# Patient Record
Sex: FEMALE | Race: WHITE | NOT HISPANIC OR LATINO | Employment: OTHER | URBAN - METROPOLITAN AREA
[De-identification: names, ages, dates, MRNs, and addresses within clinical notes are randomized per-mention and may not be internally consistent; named-entity substitution may affect disease eponyms.]

---

## 2023-08-18 LAB
ALANINE AMINOTRANSFERASE (SGPT) (U/L) IN SER/PLAS: 16 U/L (ref 7–45)
ALBUMIN (G/DL) IN SER/PLAS: 3.8 G/DL (ref 3.4–5)
ALKALINE PHOSPHATASE (U/L) IN SER/PLAS: 56 U/L (ref 33–136)
ANION GAP IN SER/PLAS: 10 MMOL/L (ref 10–20)
ASPARTATE AMINOTRANSFERASE (SGOT) (U/L) IN SER/PLAS: 26 U/L (ref 9–39)
BASOPHILS (10*3/UL) IN BLOOD BY AUTOMATED COUNT: 0.04 X10E9/L (ref 0–0.1)
BASOPHILS/100 LEUKOCYTES IN BLOOD BY AUTOMATED COUNT: 0.5 % (ref 0–2)
BILIRUBIN TOTAL (MG/DL) IN SER/PLAS: 0.3 MG/DL (ref 0–1.2)
CALCIUM (MG/DL) IN SER/PLAS: 8 MG/DL (ref 8.6–10.3)
CARBON DIOXIDE, TOTAL (MMOL/L) IN SER/PLAS: 28 MMOL/L (ref 21–32)
CHLORIDE (MMOL/L) IN SER/PLAS: 103 MMOL/L (ref 98–107)
CHOLESTEROL (MG/DL) IN SER/PLAS: 153 MG/DL (ref 0–199)
CHOLESTEROL IN HDL (MG/DL) IN SER/PLAS: 67.8 MG/DL
CHOLESTEROL/HDL RATIO: 2.3
CREATININE (MG/DL) IN SER/PLAS: 1.04 MG/DL (ref 0.5–1.05)
EOSINOPHILS (10*3/UL) IN BLOOD BY AUTOMATED COUNT: 0.13 X10E9/L (ref 0–0.4)
EOSINOPHILS/100 LEUKOCYTES IN BLOOD BY AUTOMATED COUNT: 1.7 % (ref 0–6)
ERYTHROCYTE DISTRIBUTION WIDTH (RATIO) BY AUTOMATED COUNT: 16.2 % (ref 11.5–14.5)
ERYTHROCYTE MEAN CORPUSCULAR HEMOGLOBIN CONCENTRATION (G/DL) BY AUTOMATED: 31.1 G/DL (ref 32–36)
ERYTHROCYTE MEAN CORPUSCULAR VOLUME (FL) BY AUTOMATED COUNT: 89 FL (ref 80–100)
ERYTHROCYTES (10*6/UL) IN BLOOD BY AUTOMATED COUNT: 3.99 X10E12/L (ref 4–5.2)
GFR FEMALE: 54 ML/MIN/1.73M2
GLUCOSE (MG/DL) IN SER/PLAS: 112 MG/DL (ref 74–99)
HEMATOCRIT (%) IN BLOOD BY AUTOMATED COUNT: 35.7 % (ref 36–46)
HEMOGLOBIN (G/DL) IN BLOOD: 11.1 G/DL (ref 12–16)
IMMATURE GRANULOCYTES/100 LEUKOCYTES IN BLOOD BY AUTOMATED COUNT: 0.3 % (ref 0–0.9)
LDL: 71 MG/DL (ref 0–99)
LEUKOCYTES (10*3/UL) IN BLOOD BY AUTOMATED COUNT: 7.8 X10E9/L (ref 4.4–11.3)
LYMPHOCYTES (10*3/UL) IN BLOOD BY AUTOMATED COUNT: 1.33 X10E9/L (ref 0.8–3)
LYMPHOCYTES/100 LEUKOCYTES IN BLOOD BY AUTOMATED COUNT: 17.1 % (ref 13–44)
MONOCYTES (10*3/UL) IN BLOOD BY AUTOMATED COUNT: 0.7 X10E9/L (ref 0.05–0.8)
MONOCYTES/100 LEUKOCYTES IN BLOOD BY AUTOMATED COUNT: 9 % (ref 2–10)
NEUTROPHILS (10*3/UL) IN BLOOD BY AUTOMATED COUNT: 5.55 X10E9/L (ref 1.6–5.5)
NEUTROPHILS/100 LEUKOCYTES IN BLOOD BY AUTOMATED COUNT: 71.4 % (ref 40–80)
NRBC (PER 100 WBCS) BY AUTOMATED COUNT: 0 /100 WBC (ref 0–0)
PLATELETS (10*3/UL) IN BLOOD AUTOMATED COUNT: 234 X10E9/L (ref 150–450)
POTASSIUM (MMOL/L) IN SER/PLAS: 4.1 MMOL/L (ref 3.5–5.3)
PROTEIN TOTAL: 6.1 G/DL (ref 6.4–8.2)
SODIUM (MMOL/L) IN SER/PLAS: 137 MMOL/L (ref 136–145)
THYROTROPIN (MIU/L) IN SER/PLAS BY DETECTION LIMIT <= 0.05 MIU/L: 1.77 MIU/L (ref 0.44–3.98)
TRIGLYCERIDE (MG/DL) IN SER/PLAS: 70 MG/DL (ref 0–149)
UREA NITROGEN (MG/DL) IN SER/PLAS: 15 MG/DL (ref 6–23)
VLDL: 14 MG/DL (ref 0–40)

## 2023-10-23 ENCOUNTER — HOSPITAL ENCOUNTER (OUTPATIENT)
Dept: RADIOLOGY | Facility: HOSPITAL | Age: 78
Discharge: HOME | End: 2023-10-23
Payer: MEDICARE

## 2023-10-23 DIAGNOSIS — K21.9 GASTRO-ESOPHAGEAL REFLUX DISEASE WITHOUT ESOPHAGITIS: ICD-10-CM

## 2023-10-23 PROCEDURE — 74220 X-RAY XM ESOPHAGUS 1CNTRST: CPT | Mod: FY

## 2023-10-23 PROCEDURE — 74220 X-RAY XM ESOPHAGUS 1CNTRST: CPT | Performed by: STUDENT IN AN ORGANIZED HEALTH CARE EDUCATION/TRAINING PROGRAM

## 2023-12-02 ENCOUNTER — APPOINTMENT (OUTPATIENT)
Dept: RADIOLOGY | Facility: HOSPITAL | Age: 78
End: 2023-12-02
Payer: MEDICARE

## 2023-12-02 ENCOUNTER — HOSPITAL ENCOUNTER (EMERGENCY)
Facility: HOSPITAL | Age: 78
Discharge: HOME | End: 2023-12-02
Attending: EMERGENCY MEDICINE
Payer: MEDICARE

## 2023-12-02 VITALS
BODY MASS INDEX: 30.36 KG/M2 | WEIGHT: 165 LBS | TEMPERATURE: 97.9 F | SYSTOLIC BLOOD PRESSURE: 156 MMHG | HEART RATE: 75 BPM | HEIGHT: 62 IN | RESPIRATION RATE: 16 BRPM | OXYGEN SATURATION: 96 % | DIASTOLIC BLOOD PRESSURE: 73 MMHG

## 2023-12-02 DIAGNOSIS — M54.50 LOW BACK PAIN WITHOUT SCIATICA, UNSPECIFIED BACK PAIN LATERALITY, UNSPECIFIED CHRONICITY: Primary | ICD-10-CM

## 2023-12-02 PROCEDURE — 70450 CT HEAD/BRAIN W/O DYE: CPT | Performed by: RADIOLOGY

## 2023-12-02 PROCEDURE — 72128 CT CHEST SPINE W/O DYE: CPT | Performed by: RADIOLOGY

## 2023-12-02 PROCEDURE — 72125 CT NECK SPINE W/O DYE: CPT | Performed by: RADIOLOGY

## 2023-12-02 PROCEDURE — 72125 CT NECK SPINE W/O DYE: CPT

## 2023-12-02 PROCEDURE — 70450 CT HEAD/BRAIN W/O DYE: CPT

## 2023-12-02 PROCEDURE — 99285 EMERGENCY DEPT VISIT HI MDM: CPT | Performed by: EMERGENCY MEDICINE

## 2023-12-02 PROCEDURE — 72128 CT CHEST SPINE W/O DYE: CPT

## 2023-12-02 ASSESSMENT — COLUMBIA-SUICIDE SEVERITY RATING SCALE - C-SSRS
1. IN THE PAST MONTH, HAVE YOU WISHED YOU WERE DEAD OR WISHED YOU COULD GO TO SLEEP AND NOT WAKE UP?: NO
6. HAVE YOU EVER DONE ANYTHING, STARTED TO DO ANYTHING, OR PREPARED TO DO ANYTHING TO END YOUR LIFE?: NO
2. HAVE YOU ACTUALLY HAD ANY THOUGHTS OF KILLING YOURSELF?: NO

## 2023-12-02 ASSESSMENT — PAIN SCALES - GENERAL: PAINLEVEL_OUTOF10: 8

## 2023-12-02 ASSESSMENT — PAIN DESCRIPTION - DESCRIPTORS: DESCRIPTORS: PRESSURE

## 2023-12-02 ASSESSMENT — PAIN DESCRIPTION - PAIN TYPE: TYPE: ACUTE PAIN

## 2023-12-02 ASSESSMENT — PAIN DESCRIPTION - LOCATION: LOCATION: HEAD

## 2023-12-02 ASSESSMENT — PAIN - FUNCTIONAL ASSESSMENT: PAIN_FUNCTIONAL_ASSESSMENT: 0-10
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
## 2024-01-31 ENCOUNTER — OFFICE VISIT (OUTPATIENT)
Dept: VASCULAR SURGERY | Facility: CLINIC | Age: 79
End: 2024-01-31
Payer: MEDICARE

## 2024-01-31 VITALS
HEIGHT: 62 IN | SYSTOLIC BLOOD PRESSURE: 124 MMHG | HEART RATE: 75 BPM | WEIGHT: 180 LBS | DIASTOLIC BLOOD PRESSURE: 76 MMHG | BODY MASS INDEX: 33.13 KG/M2

## 2024-01-31 DIAGNOSIS — M79.606 PAIN AND SWELLING OF LOWER EXTREMITY, UNSPECIFIED LATERALITY: ICD-10-CM

## 2024-01-31 DIAGNOSIS — I83.813 VARICOSE VEINS OF BOTH LOWER EXTREMITIES WITH PAIN: ICD-10-CM

## 2024-01-31 DIAGNOSIS — M79.89 PAIN AND SWELLING OF LOWER EXTREMITY, UNSPECIFIED LATERALITY: ICD-10-CM

## 2024-01-31 PROCEDURE — 1036F TOBACCO NON-USER: CPT | Performed by: SURGERY

## 2024-01-31 PROCEDURE — 1125F AMNT PAIN NOTED PAIN PRSNT: CPT | Performed by: SURGERY

## 2024-01-31 PROCEDURE — 1159F MED LIST DOCD IN RCRD: CPT | Performed by: SURGERY

## 2024-01-31 PROCEDURE — 99214 OFFICE O/P EST MOD 30 MIN: CPT | Performed by: SURGERY

## 2024-01-31 PROCEDURE — 1160F RVW MEDS BY RX/DR IN RCRD: CPT | Performed by: SURGERY

## 2024-01-31 RX ORDER — METOPROLOL SUCCINATE 50 MG/1
1 TABLET, EXTENDED RELEASE ORAL DAILY
COMMUNITY

## 2024-01-31 RX ORDER — ASPIRIN 81 MG/1
81 TABLET ORAL
COMMUNITY
Start: 2023-06-27

## 2024-01-31 RX ORDER — DOXAZOSIN 4 MG/1
1 TABLET ORAL NIGHTLY
COMMUNITY
Start: 2022-04-21

## 2024-01-31 RX ORDER — DOXAZOSIN 2 MG/1
1 TABLET ORAL NIGHTLY
COMMUNITY
Start: 2022-05-24

## 2024-01-31 RX ORDER — ALBUTEROL SULFATE 90 UG/1
AEROSOL, METERED RESPIRATORY (INHALATION)
COMMUNITY
Start: 2023-09-08

## 2024-01-31 RX ORDER — BUDESONIDE, GLYCOPYRROLATE, AND FORMOTEROL FUMARATE 160; 9; 4.8 UG/1; UG/1; UG/1
2 AEROSOL, METERED RESPIRATORY (INHALATION) 2 TIMES DAILY
COMMUNITY
Start: 2023-05-18

## 2024-01-31 RX ORDER — EZETIMIBE 10 MG/1
1 TABLET ORAL DAILY
COMMUNITY
End: 2024-05-08 | Stop reason: SDUPTHER

## 2024-01-31 RX ORDER — TORSEMIDE 20 MG/1
1 TABLET ORAL DAILY
COMMUNITY
Start: 2023-03-20

## 2024-01-31 RX ORDER — FLAXSEED OIL 1000 MG
CAPSULE ORAL
COMMUNITY

## 2024-01-31 ASSESSMENT — COLUMBIA-SUICIDE SEVERITY RATING SCALE - C-SSRS
2. HAVE YOU ACTUALLY HAD ANY THOUGHTS OF KILLING YOURSELF?: NO
6. HAVE YOU EVER DONE ANYTHING, STARTED TO DO ANYTHING, OR PREPARED TO DO ANYTHING TO END YOUR LIFE?: NO
1. IN THE PAST MONTH, HAVE YOU WISHED YOU WERE DEAD OR WISHED YOU COULD GO TO SLEEP AND NOT WAKE UP?: NO

## 2024-01-31 ASSESSMENT — PATIENT HEALTH QUESTIONNAIRE - PHQ9
1. LITTLE INTEREST OR PLEASURE IN DOING THINGS: NOT AT ALL
2. FEELING DOWN, DEPRESSED OR HOPELESS: NOT AT ALL
SUM OF ALL RESPONSES TO PHQ9 QUESTIONS 1 AND 2: 0
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
## 2024-02-20 ENCOUNTER — APPOINTMENT (OUTPATIENT)
Dept: VASCULAR MEDICINE | Facility: HOSPITAL | Age: 79
End: 2024-02-20
Payer: MEDICARE

## 2024-03-01 ENCOUNTER — HOSPITAL ENCOUNTER (OUTPATIENT)
Dept: VASCULAR MEDICINE | Facility: HOSPITAL | Age: 79
Discharge: HOME | End: 2024-03-01
Payer: MEDICARE

## 2024-03-01 DIAGNOSIS — M79.606 PAIN AND SWELLING OF LOWER EXTREMITY, UNSPECIFIED LATERALITY: ICD-10-CM

## 2024-03-01 DIAGNOSIS — I83.813 VARICOSE VEINS OF BOTH LOWER EXTREMITIES WITH PAIN: ICD-10-CM

## 2024-03-01 DIAGNOSIS — I87.2 VENOUS INSUFFICIENCY (CHRONIC) (PERIPHERAL): ICD-10-CM

## 2024-03-01 DIAGNOSIS — M79.89 PAIN AND SWELLING OF LOWER EXTREMITY, UNSPECIFIED LATERALITY: ICD-10-CM

## 2024-03-01 PROCEDURE — 93970 EXTREMITY STUDY: CPT

## 2024-03-01 PROCEDURE — 93970 EXTREMITY STUDY: CPT | Performed by: INTERNAL MEDICINE

## 2024-03-07 ENCOUNTER — OFFICE VISIT (OUTPATIENT)
Dept: VASCULAR SURGERY | Facility: CLINIC | Age: 79
End: 2024-03-07
Payer: MEDICARE

## 2024-03-07 VITALS
WEIGHT: 176 LBS | OXYGEN SATURATION: 96 % | BODY MASS INDEX: 32.39 KG/M2 | HEART RATE: 67 BPM | DIASTOLIC BLOOD PRESSURE: 60 MMHG | HEIGHT: 62 IN | SYSTOLIC BLOOD PRESSURE: 130 MMHG

## 2024-03-07 DIAGNOSIS — M79.89 PAIN AND SWELLING OF LOWER EXTREMITY, UNSPECIFIED LATERALITY: ICD-10-CM

## 2024-03-07 DIAGNOSIS — M79.606 PAIN AND SWELLING OF LOWER EXTREMITY, UNSPECIFIED LATERALITY: ICD-10-CM

## 2024-03-07 DIAGNOSIS — I83.813 VARICOSE VEINS OF BOTH LOWER EXTREMITIES WITH PAIN: Primary | ICD-10-CM

## 2024-03-07 PROCEDURE — 99214 OFFICE O/P EST MOD 30 MIN: CPT | Performed by: SURGERY

## 2024-03-07 PROCEDURE — 1159F MED LIST DOCD IN RCRD: CPT | Performed by: SURGERY

## 2024-03-07 PROCEDURE — 1160F RVW MEDS BY RX/DR IN RCRD: CPT | Performed by: SURGERY

## 2024-03-07 PROCEDURE — 1036F TOBACCO NON-USER: CPT | Performed by: SURGERY

## 2024-03-07 PROCEDURE — 1125F AMNT PAIN NOTED PAIN PRSNT: CPT | Performed by: SURGERY

## 2024-03-10 NOTE — PROGRESS NOTES
"Jesi Ramos is a 78 y.o. female     Subjective   This patient presents today for follow-up of her venous issues of her lower extremities.  She is 5 foot 2 and weighs 176 pounds.  She quit smoking in 2008.  She is currently 78 years old.  She does have a small venous stasis ulcer with a very very small opening involving her left lower leg.  She has been placing topical antibiotic cream over the ulcer and surrounding skin.  She currently denies any fever chills nausea vomiting or headache.  She does admit to some achiness and heaviness of her legs.  She is currently not a diabetic.           Objective     Vitals:    03/07/24 1100   BP: 130/60   Pulse: 67   SpO2: 96%      Physical Exam  This patient is alert and oriented x 3.  She is in no acute distress.  Head is normocephalic.  Pupils are equal and reactive to light accommodation.  Neck is soft and supple without palpable lymph nodes.  Heart is regular rate.  Lungs are relatively clear.  Abdomen is soft with positive bowel sounds there is no pain on palpation.  Upper extremities seem to have adequate range of motion.  Her lower extremities have some decreased range of motion with palpable brachial radial femoral and popliteal pulses.  Skin turgor slightly decreased in her lower extremities left greater than right.  Psychologically she appears to be acting appropriately.  She has a very small open venous stasis ulcer left lower leg.  There is also some satellite red lesions identified around the area.  Blood pressure 130/60, pulse 67, height 1.575 m (5' 2\"), weight 79.8 kg (176 lb), SpO2 96 %.            Patient Active Problem List    Diagnosis Date Noted    Varicose veins of both lower extremities with pain 01/31/2024    Pain and swelling of lower extremity 01/31/2024          Current Outpatient Medications:     albuterol 90 mcg/actuation inhaler, INHALE TWO PUFFS BY MOUTH AS INSTRUCTED EVERY 4 HOURS AS NEEDED, Disp: , Rfl:     aspirin 81 mg EC tablet, 1 tablet " (81 mg)., Disp: , Rfl:     Breztri Aerosphere 160-9-4.8 mcg/actuation HFA aerosol inhaler, Inhale 2 puffs twice a day., Disp: , Rfl:     calcium phosphate trib/vit D3 (CALCIUM PHOSPHATE-VITAMIN D3 ORAL), Take by mouth once daily., Disp: , Rfl:     doxazosin (Cardura) 2 mg tablet, Take 1 tablet (2 mg) by mouth once daily at bedtime., Disp: , Rfl:     doxazosin (Cardura) 4 mg tablet, Take 1 tablet (4 mg) by mouth once daily at bedtime., Disp: , Rfl:     ezetimibe (Zetia) 10 mg tablet, Take 1 tablet (10 mg) by mouth once daily., Disp: , Rfl:     flaxseed oiL 1,000 mg capsule, Take by mouth., Disp: , Rfl:     metoprolol succinate XL (Toprol-XL) 50 mg 24 hr tablet, Take 1 tablet (50 mg) by mouth once daily., Disp: , Rfl:     torsemide (Demadex) 20 mg tablet, Take 1 tablet (20 mg) by mouth once daily., Disp: , Rfl:      Lab Results   Component Value Date    WBC 7.8 08/18/2023    HGB 11.1 (L) 08/18/2023    HCT 35.7 (L) 08/18/2023     08/18/2023    CHOL 153 08/18/2023    TRIG 70 08/18/2023    HDL 67.8 08/18/2023    ALT 16 08/18/2023    AST 26 08/18/2023     08/18/2023    K 4.1 08/18/2023     08/18/2023    CREATININE 1.04 08/18/2023    BUN 15 08/18/2023    CO2 28 08/18/2023    TSH 1.77 08/18/2023    INR CANCELED 03/14/2023    INR 1.3 (H) 03/14/2023       Vascular US lower extremity venous insufficiency bilateral    Result Date: 3/3/2024           [address] [place], [place] [address] Tel [phone] and Fax [phone]  Vascular Lab Report VASC US LOWER EXTREMITY VENOUS INSUFFICIENCY BILATERAL  Patient Name:      KAYLEE VELASQUEZ        Reading Physician:  [id number] Brianna Garcia MD Study Date:        3/1/2024            Ordering Physician: [id number] JERRELL CHEN MRN/PID:           [id number]            Technologist:       Justyna Dietz T Accession#:        [id number]        Technologist 2: Date of Birth/Age: 1945 / 78      Encounter#:         [id number]                    years Gender:            F Admission Status:  Outpatient          Location Performed: [facility]  Diagnosis/ICD:    Venous insufficiency (chronic) (peripheral)-I87.2 Indication:       Varicose veins ulcer CPT Codes:        51874 Venous reflux study VV VI complete Patient Position: Study performed in a reverse Trendelenburg position.  CONCLUSIONS: Right Lower Venous Insufficiency: Right leg demonstrates no evidence of deep vein thrombosis or deep or superficial venous insufficiency. The small saphenous vein was not visualized. Left Lower Venous Insufficiency: Left leg demonstrates no evidence of deep vein thrombosis or deep or superficial venous insufficiency. The small saphenous vein was not visualized.  Comparison: Compared with study from 3/15/2023, resolution of prior right leg deep vein thrombosis.  Imaging & Doppler Findings:  Right          Compress Thrombus SFJ              Yes      None Prox Thigh GSV   Yes      None Mid Thigh GSV    Yes      None Knee GSV         Yes      None Prox Calf GSV    Yes      None Mid Calf GSV     Yes      None Dist Calf GSV    Yes      None  Left           Compress Thrombus SFJ              Yes      None Prox Thigh GSV   Yes      None Mid Thigh GSV    Yes      None Knee GSV         Yes      None Prox Calf GSV    Yes      None Mid Calf GSV     Yes      None Dist Calf GSV    Yes      None  Right                 Compressible Thrombus        Flow Distal External Iliac     Yes        None       Pulsatile CFV                       Yes        None       Pulsatile PFV                       Yes        None FV Proximal               Yes        None       Pulsatile FV Mid                    Yes        None       Pulsatile FV Distal                 Yes        None Popliteal                 Yes        None   Spontaneous/Phasic Peroneal                  Yes        None PTV                       Yes        None  Left                   Compress Thrombus        Flow Distal External Iliac   Yes      None       Pulsatile CFV                     Yes      None       Pulsatile PFV                     Yes      None FV Proximal             Yes      None       Pulsatile FV Mid                  Yes      None       Pulsatile FV Distal               Yes      None   Spontaneous/Phasic Popliteal               Yes      None   Spontaneous/Phasic Peroneal                Yes      None PTV                     Yes      None  [id number] Brianna Garcia MD Electronically signed by [id number] Brianna Garcia MD on 3/3/2024 at 1:33:36 PM  ** Final **                 Assessment/Plan   Active Problems:  There are no active Hospital Problems.      This patient presents today for follow-up of her venous issues of her lower extremities.  She does have a very very small open venous stasis ulcer of her left lower extremity.  She has been placing topical antibiotic over the ulcer and surrounding skin.  She does have red satellite lesions around the ulcer which I suspect is fungal from the topical antibiotic.  I have recommended to the patient that she stops the topical antibiotic.  She did have a venous duplex scan with reflux study that does show pulsatility throughout her right and left lower extremity deep system.  I am recommending that she elevates her legs periodically during the day and to exercise her lower extremities on a consistent basis the stationary bike or floor paddles.  She did have an echo done in March of last year which did not show any significant valvular issues and a normal ejection fraction.  I am also encouraging her to keep a sterile bandage over the small ulceration and to wear her compression stockings.  I would like her to follow-up with me in 3 months      Ovi Rouse, DO

## 2024-04-11 PROBLEM — N95.0 PMB (POSTMENOPAUSAL BLEEDING): Status: ACTIVE | Noted: 2023-06-30

## 2024-04-11 PROBLEM — J18.9 PNEUMONIA: Status: ACTIVE | Noted: 2024-04-11

## 2024-04-11 PROBLEM — Z86.73 HISTORY OF CEREBROVASCULAR ACCIDENT: Status: ACTIVE | Noted: 2024-04-11

## 2024-04-11 PROBLEM — R10.9 ABDOMINAL PAIN: Status: ACTIVE | Noted: 2023-07-27

## 2024-04-11 PROBLEM — B35.1 ONYCHOMYCOSIS: Status: ACTIVE | Noted: 2024-04-11

## 2024-04-11 PROBLEM — I71.21 ASCENDING AORTIC ANEURYSM (CMS-HCC): Status: ACTIVE | Noted: 2022-07-11

## 2024-04-11 PROBLEM — I99.8 VASCULAR INSUFFICIENCY: Status: ACTIVE | Noted: 2024-04-11

## 2024-04-11 PROBLEM — Z86.39 HISTORY OF OBESITY: Status: ACTIVE | Noted: 2024-04-11

## 2024-04-11 PROBLEM — R94.31 ABNORMAL ECG: Status: ACTIVE | Noted: 2022-07-11

## 2024-04-11 PROBLEM — M79.672 FOOT PAIN, BILATERAL: Status: ACTIVE | Noted: 2024-04-11

## 2024-04-11 PROBLEM — I80.01: Status: ACTIVE | Noted: 2024-04-11

## 2024-04-11 PROBLEM — I73.9 PAD (PERIPHERAL ARTERY DISEASE) (CMS-HCC): Status: ACTIVE | Noted: 2024-04-11

## 2024-04-11 PROBLEM — L85.3 ASTEATOSIS CUTIS: Status: ACTIVE | Noted: 2024-04-11

## 2024-04-11 PROBLEM — I21.4 ACUTE NON Q WAVE MYOCARDIAL INFARCTION (MULTI): Status: ACTIVE | Noted: 2024-04-11

## 2024-04-11 PROBLEM — J98.4 CHRONIC LUNG DISEASE: Status: ACTIVE | Noted: 2024-04-11

## 2024-04-11 PROBLEM — S81.809A WOUND OF LOWER EXTREMITY: Status: ACTIVE | Noted: 2024-04-11

## 2024-04-11 PROBLEM — M21.621 TAILOR'S BUNION OF BOTH FEET: Status: ACTIVE | Noted: 2024-04-11

## 2024-04-11 PROBLEM — L25.9 CONTACT DERMATITIS: Status: ACTIVE | Noted: 2024-04-11

## 2024-04-11 PROBLEM — I10 ESSENTIAL HYPERTENSION: Status: ACTIVE | Noted: 2022-07-11

## 2024-04-11 PROBLEM — R79.89 ELEVATED TROPONIN LEVEL: Status: ACTIVE | Noted: 2024-04-11

## 2024-04-11 PROBLEM — I25.10 ARTERIOSCLEROSIS OF CORONARY ARTERY: Status: ACTIVE | Noted: 2024-04-11

## 2024-04-11 PROBLEM — L84 HELOMA MOLLE: Status: ACTIVE | Noted: 2024-04-11

## 2024-04-11 PROBLEM — M54.50 LOW BACK PAIN: Status: ACTIVE | Noted: 2024-04-11

## 2024-04-11 PROBLEM — S39.012A LUMBAR SPINE STRAIN, INITIAL ENCOUNTER: Status: ACTIVE | Noted: 2023-06-12

## 2024-04-11 PROBLEM — I26.99 PULMONARY EMBOLISM (MULTI): Status: ACTIVE | Noted: 2024-04-11

## 2024-04-11 PROBLEM — Z86.39 HISTORY OF HYPERCHOLESTEROLEMIA: Status: ACTIVE | Noted: 2024-04-11

## 2024-04-11 PROBLEM — K80.10 CHRONIC CHOLECYSTITIS WITH CALCULUS: Status: ACTIVE | Noted: 2024-04-11

## 2024-04-11 PROBLEM — G45.9 TRANSIENT ISCHEMIC ATTACK: Status: ACTIVE | Noted: 2024-04-11

## 2024-04-11 PROBLEM — R07.89 CHEST PAIN, MIDSTERNAL: Status: ACTIVE | Noted: 2022-04-28

## 2024-04-11 PROBLEM — R26.89 ANTALGIC GAIT: Status: ACTIVE | Noted: 2024-04-11

## 2024-04-11 PROBLEM — L50.9 URTICARIA: Status: ACTIVE | Noted: 2024-04-11

## 2024-04-11 PROBLEM — M19.011 ARTHRITIS OF RIGHT SHOULDER REGION: Status: ACTIVE | Noted: 2023-11-13

## 2024-04-11 PROBLEM — E78.00 HYPERCHOLESTEREMIA: Status: ACTIVE | Noted: 2022-07-11

## 2024-04-11 PROBLEM — R91.8 PULMONARY NODULES: Status: ACTIVE | Noted: 2021-07-21

## 2024-04-11 PROBLEM — M21.622 TAILOR'S BUNION OF BOTH FEET: Status: ACTIVE | Noted: 2024-04-11

## 2024-04-11 PROBLEM — R06.02 SHORTNESS OF BREATH: Status: ACTIVE | Noted: 2021-07-21

## 2024-04-11 PROBLEM — Z86.711 HX PULMONARY EMBOLISM: Status: ACTIVE | Noted: 2024-04-11

## 2024-04-11 PROBLEM — M79.671 FOOT PAIN, BILATERAL: Status: ACTIVE | Noted: 2024-04-11

## 2024-04-11 PROBLEM — M17.12 PRIMARY OSTEOARTHRITIS OF LEFT KNEE: Status: ACTIVE | Noted: 2023-06-12

## 2024-04-11 PROBLEM — K21.9 GASTROESOPHAGEAL REFLUX DISEASE: Status: ACTIVE | Noted: 2024-04-11

## 2024-04-11 PROBLEM — M70.62 TROCHANTERIC BURSITIS OF LEFT HIP: Status: ACTIVE | Noted: 2023-11-13

## 2024-04-11 PROBLEM — M17.0 PRIMARY OSTEOARTHRITIS OF BOTH KNEES: Status: ACTIVE | Noted: 2023-08-31

## 2024-04-11 RX ORDER — ALBUTEROL SULFATE 0.83 MG/ML
SOLUTION RESPIRATORY (INHALATION)
COMMUNITY
End: 2024-05-14 | Stop reason: WASHOUT

## 2024-04-11 RX ORDER — METOPROLOL TARTRATE 25 MG/1
TABLET, FILM COATED ORAL
COMMUNITY
Start: 2024-02-20 | End: 2024-05-14 | Stop reason: WASHOUT

## 2024-04-11 RX ORDER — DENOSUMAB 60 MG/ML
INJECTION SUBCUTANEOUS
COMMUNITY
Start: 2024-01-12

## 2024-04-11 RX ORDER — CEFUROXIME AXETIL 500 MG/1
500 TABLET ORAL 2 TIMES DAILY
COMMUNITY
Start: 2024-02-21 | End: 2024-05-14 | Stop reason: WASHOUT

## 2024-04-11 RX ORDER — SIMVASTATIN 20 MG/1
TABLET, FILM COATED ORAL
COMMUNITY
End: 2024-05-14 | Stop reason: WASHOUT

## 2024-04-11 RX ORDER — PANTOPRAZOLE SODIUM 40 MG/1
TABLET, DELAYED RELEASE ORAL
COMMUNITY
Start: 2023-09-27

## 2024-04-11 RX ORDER — CLOPIDOGREL BISULFATE 75 MG/1
TABLET ORAL
COMMUNITY
End: 2024-05-14

## 2024-04-11 RX ORDER — APIXABAN 5 MG/1
5 TABLET, FILM COATED ORAL 2 TIMES DAILY
COMMUNITY
Start: 2023-07-21 | End: 2024-05-14 | Stop reason: WASHOUT

## 2024-04-11 RX ORDER — ECONAZOLE NITRATE 10 MG/G
CREAM TOPICAL
COMMUNITY
Start: 2023-07-30

## 2024-04-11 RX ORDER — LOSARTAN POTASSIUM 25 MG/1
25 TABLET ORAL DAILY
COMMUNITY
Start: 2024-01-09 | End: 2024-05-14 | Stop reason: WASHOUT

## 2024-04-11 RX ORDER — PRAVASTATIN SODIUM 40 MG/1
1 TABLET ORAL DAILY
COMMUNITY
End: 2024-05-14 | Stop reason: WASHOUT

## 2024-04-15 ENCOUNTER — OFFICE VISIT (OUTPATIENT)
Dept: OTOLARYNGOLOGY | Facility: CLINIC | Age: 79
End: 2024-04-15
Payer: MEDICARE

## 2024-04-15 VITALS — BODY MASS INDEX: 32.76 KG/M2 | WEIGHT: 178 LBS | TEMPERATURE: 97.2 F | HEIGHT: 62 IN

## 2024-04-15 DIAGNOSIS — H93.8X1 SENSATION OF PLUGGED EAR ON RIGHT SIDE: Primary | ICD-10-CM

## 2024-04-15 DIAGNOSIS — H61.23 BILATERAL IMPACTED CERUMEN: ICD-10-CM

## 2024-04-15 PROCEDURE — 1126F AMNT PAIN NOTED NONE PRSNT: CPT | Performed by: NURSE PRACTITIONER

## 2024-04-15 PROCEDURE — 1036F TOBACCO NON-USER: CPT | Performed by: NURSE PRACTITIONER

## 2024-04-15 PROCEDURE — 69210 REMOVE IMPACTED EAR WAX UNI: CPT | Performed by: NURSE PRACTITIONER

## 2024-04-15 PROCEDURE — 99213 OFFICE O/P EST LOW 20 MIN: CPT | Performed by: NURSE PRACTITIONER

## 2024-04-15 PROCEDURE — 1160F RVW MEDS BY RX/DR IN RCRD: CPT | Performed by: NURSE PRACTITIONER

## 2024-04-15 PROCEDURE — 99203 OFFICE O/P NEW LOW 30 MIN: CPT | Performed by: NURSE PRACTITIONER

## 2024-04-15 PROCEDURE — 1159F MED LIST DOCD IN RCRD: CPT | Performed by: NURSE PRACTITIONER

## 2024-04-15 SDOH — ECONOMIC STABILITY: FOOD INSECURITY: WITHIN THE PAST 12 MONTHS, THE FOOD YOU BOUGHT JUST DIDN'T LAST AND YOU DIDN'T HAVE MONEY TO GET MORE.: NEVER TRUE

## 2024-04-15 SDOH — ECONOMIC STABILITY: FOOD INSECURITY: WITHIN THE PAST 12 MONTHS, YOU WORRIED THAT YOUR FOOD WOULD RUN OUT BEFORE YOU GOT MONEY TO BUY MORE.: NEVER TRUE

## 2024-04-15 ASSESSMENT — COLUMBIA-SUICIDE SEVERITY RATING SCALE - C-SSRS
6. HAVE YOU EVER DONE ANYTHING, STARTED TO DO ANYTHING, OR PREPARED TO DO ANYTHING TO END YOUR LIFE?: NO
2. HAVE YOU ACTUALLY HAD ANY THOUGHTS OF KILLING YOURSELF?: NO
1. IN THE PAST MONTH, HAVE YOU WISHED YOU WERE DEAD OR WISHED YOU COULD GO TO SLEEP AND NOT WAKE UP?: NO

## 2024-04-15 ASSESSMENT — LIFESTYLE VARIABLES
AUDIT-C TOTAL SCORE: 0
SKIP TO QUESTIONS 9-10: 1
HOW OFTEN DO YOU HAVE SIX OR MORE DRINKS ON ONE OCCASION: NEVER
HOW MANY STANDARD DRINKS CONTAINING ALCOHOL DO YOU HAVE ON A TYPICAL DAY: PATIENT DOES NOT DRINK
HOW OFTEN DO YOU HAVE A DRINK CONTAINING ALCOHOL: NEVER

## 2024-04-15 ASSESSMENT — PATIENT HEALTH QUESTIONNAIRE - PHQ9
2. FEELING DOWN, DEPRESSED OR HOPELESS: NOT AT ALL
1. LITTLE INTEREST OR PLEASURE IN DOING THINGS: NOT AT ALL
SUM OF ALL RESPONSES TO PHQ9 QUESTIONS 1 AND 2: 0

## 2024-04-15 ASSESSMENT — ENCOUNTER SYMPTOMS
LOSS OF SENSATION IN FEET: 0
DEPRESSION: 0
OCCASIONAL FEELINGS OF UNSTEADINESS: 0

## 2024-04-15 ASSESSMENT — PAIN SCALES - GENERAL: PAINLEVEL: 0-NO PAIN

## 2024-04-15 NOTE — PROGRESS NOTES
Subjective   Patient ID: Jesi Ramos is a 78 y.o. female who presents for New Patient Visit (Ear cleaning).    HPI  Patient here for ears. The right ear was bothering her. She was using some OTC cerumen removal drops which helped. It was feeling clogged with some pain and drainage. She has bilateral tinnitus for years. No hearing loss. No vertigo. No previous ear surgery. Loud noise exposure years ago. No family history of hearing loss.     Patient Active Problem List   Diagnosis    Varicose veins of other specified sites    Abdominal pain    Asteatosis cutis    Vascular insufficiency    Urticaria    Trochanteric bursitis of left hip    Transient ischemic attack    Onychomycosis    Ascending aortic aneurysm (CMS-HCC)    Tailor's bunion of both feet    Shortness of breath    Pulmonary nodules    Pulmonary embolism (Multi)    Primary osteoarthritis of left knee    Primary osteoarthritis of both knees    Pneumonia    PMB (postmenopausal bleeding)    Phlebitis of leg, superficial, right    PAD (peripheral artery disease) (CMS-HCC)    Lumbar spine strain, initial encounter    Low back pain    Wound of lower extremity    Hypercholesteremia    Hx pulmonary embolism    History of cerebrovascular accident    History of obesity    History of hypercholesterolemia    Elevated troponin level    Heloma molle    Gastroesophageal reflux disease    Antalgic gait    Foot pain, bilateral    Otalgia    Essential hypertension    Dizziness    CAD (coronary artery disease)    Chronic lung disease    Contact dermatitis    Chronic cholecystitis with calculus    Chest pain, midsternal    Benign neoplasm of cerebral meninges (Multi)    Arthritis of right shoulder region    Acute non Q wave myocardial infarction (Multi)    Abnormal ECG     Past Surgical History:   Procedure Laterality Date    GALLBLADDER SURGERY  06/26/2018    Gallbladder Surgery    OTHER SURGICAL HISTORY  03/22/2019    History of prior surgery     Review of Systems    All  other systems have been reviewed and are negative for complaints except for those mentioned in history of present illness, past medical history and problem list.    Objective   Physical Exam    Constitutional: No fever, chills, weight loss or weight gain  General appearance: Appears well, well-nourished, well groomed. No acute distress.    Communication: Normal communication    Psychiatric: Oriented to person, place and time. Normal mood and affect.    Neurologic: Cranial nerves II-XII grossly intact and symmetric bilaterally.    Head and Face:  Head: Atraumatic with no masses, lesions or scarring.  Face: Normal symmetry. No scars or deformities.  TMJ: Normal, no trismus.    Eyes: Conjunctiva not edematous or erythematous.     Right Ear: External inspection of ear with no deformity, scars, or masses. EAC is impacted with cerumen, TM not visible.    Left Ear: External inspection of ear with no deformity, scars, or masses. EAC is impacted with cerumen, TM not visible.    Nose: External inspection of nose: No nasal lesions, lacerations or scars. Anterior rhinoscopy with limited visualization past the inferior turbinates. No tenderness on frontal or maxillary sinus palpation.    Oral Cavity/Mouth: Oral cavity and oropharynx mucosa moist and pink. No lesions or masses. Dentition normal. Tonsils appear normal. Uvula is midline. Tongue with no masses or lesions. Tongue with good mobility. The oropharynx is clear.    Neck: Normal appearing, symmetric, trachea midline.     Cardiovascular: Examination of peripheral vascular system shows no clubbing or cyanosis.    Respiratory: No respiratory distress increased work of breathing. Inspection of the chest with symmetric chest expansion and normal respiratory effort.    Skin: No head and neck rashes.    Lymph nodes: No adenopathy.     Ear cerumen removal    Date/Time: 4/15/2024 11:08 AM    Performed by: TESFAYE Fiore  Authorized by: TESFAYE Fiore     Consent:     Consent obtained:  Verbal    Consent given by:  Patient  Procedure details:     Location:  L ear and R ear    Procedure outcomes: cerumen removed    Post-procedure details:     Inspection:  No bleeding, ear canal clear and TM intact    Hearing quality:  Improved  Comments:      Bilateral canals with cerumen impaction.  Using the microscope and alligator forceps, large amounts of soft brown cerumen removed bilaterally. Both TMs intact. No effusions or retractions noted.  Patient tolerated procedure well.      Assessment/Plan   Diagnoses and all orders for this visit:  Sensation of plugged ear on right side  Bilateral impacted cerumen  Bilateral ears were successfully cleaned. Follow up as needed for repeat ear cleaning.  All questions answered to patient satisfaction.          Ofe Finley, CHRISTIAN-CNP 04/15/24 10:46 AM

## 2024-04-22 PROBLEM — M25.812 IMPINGEMENT OF LEFT SHOULDER: Status: ACTIVE | Noted: 2024-03-28

## 2024-04-22 PROBLEM — E78.5 HYPERLIPIDEMIA: Status: ACTIVE | Noted: 2022-07-11

## 2024-04-22 PROBLEM — E78.5 HYPERLIPIDEMIA: Chronic | Status: ACTIVE | Noted: 2022-07-11

## 2024-04-22 PROBLEM — Z86.39 HISTORY OF HYPERCHOLESTEROLEMIA: Status: RESOLVED | Noted: 2024-04-11 | Resolved: 2024-04-22

## 2024-05-08 DIAGNOSIS — I26.93 SINGLE SUBSEGMENTAL PULMONARY EMBOLISM WITHOUT ACUTE COR PULMONALE (MULTI): Primary | ICD-10-CM

## 2024-05-08 RX ORDER — EZETIMIBE 10 MG/1
10 TABLET ORAL DAILY
Qty: 90 TABLET | Refills: 3 | Status: SHIPPED | OUTPATIENT
Start: 2024-05-08 | End: 2024-05-14 | Stop reason: SDUPTHER

## 2024-05-14 ENCOUNTER — OFFICE VISIT (OUTPATIENT)
Dept: CARDIOLOGY | Facility: CLINIC | Age: 79
End: 2024-05-14
Payer: MEDICARE

## 2024-05-14 VITALS
SYSTOLIC BLOOD PRESSURE: 124 MMHG | BODY MASS INDEX: 33.37 KG/M2 | DIASTOLIC BLOOD PRESSURE: 82 MMHG | HEART RATE: 64 BPM | WEIGHT: 181 LBS | OXYGEN SATURATION: 92 %

## 2024-05-14 DIAGNOSIS — I26.93 SINGLE SUBSEGMENTAL PULMONARY EMBOLISM WITHOUT ACUTE COR PULMONALE (MULTI): ICD-10-CM

## 2024-05-14 DIAGNOSIS — I25.10 CORONARY ARTERY DISEASE INVOLVING NATIVE CORONARY ARTERY OF NATIVE HEART WITHOUT ANGINA PECTORIS: ICD-10-CM

## 2024-05-14 DIAGNOSIS — I26.99 PULMONARY EMBOLISM, UNSPECIFIED CHRONICITY, UNSPECIFIED PULMONARY EMBOLISM TYPE, UNSPECIFIED WHETHER ACUTE COR PULMONALE PRESENT (MULTI): Chronic | ICD-10-CM

## 2024-05-14 DIAGNOSIS — I10 ESSENTIAL HYPERTENSION: ICD-10-CM

## 2024-05-14 DIAGNOSIS — E78.2 MIXED HYPERLIPIDEMIA: Chronic | ICD-10-CM

## 2024-05-14 DIAGNOSIS — R94.31 ABNORMAL ECG: Primary | ICD-10-CM

## 2024-05-14 PROBLEM — Z86.711 HX PULMONARY EMBOLISM: Chronic | Status: ACTIVE | Noted: 2024-04-11

## 2024-05-14 PROCEDURE — 1036F TOBACCO NON-USER: CPT | Performed by: INTERNAL MEDICINE

## 2024-05-14 PROCEDURE — 99214 OFFICE O/P EST MOD 30 MIN: CPT | Performed by: INTERNAL MEDICINE

## 2024-05-14 PROCEDURE — 3074F SYST BP LT 130 MM HG: CPT | Performed by: INTERNAL MEDICINE

## 2024-05-14 PROCEDURE — 3079F DIAST BP 80-89 MM HG: CPT | Performed by: INTERNAL MEDICINE

## 2024-05-14 PROCEDURE — 1159F MED LIST DOCD IN RCRD: CPT | Performed by: INTERNAL MEDICINE

## 2024-05-14 PROCEDURE — 1160F RVW MEDS BY RX/DR IN RCRD: CPT | Performed by: INTERNAL MEDICINE

## 2024-05-14 PROCEDURE — 93000 ELECTROCARDIOGRAM COMPLETE: CPT | Performed by: INTERNAL MEDICINE

## 2024-05-14 RX ORDER — EZETIMIBE 10 MG/1
10 TABLET ORAL DAILY
Qty: 90 TABLET | Refills: 3 | Status: SHIPPED | OUTPATIENT
Start: 2024-05-14

## 2024-05-14 NOTE — PATIENT INSTRUCTIONS
1.  NSTEMI.  This is April 2023 associate with a pulmonary embolism.  No chest pain.  Continue antiplatelet therapy and cholesterol reduction regadenoson nuclear stress test April 2023 no evidence of ischemia ejection fraction 68%.  I believe this is demand ischemia.  Continue with beta-blockade     2. Shortness of breath. Being evaluated by pulmonary.  She is on pulmonary rehab     3. Hyperlipidemia. She declines statins. We did discuss Nexletol. She is not able to afford it. She is not able to afford a PCSK9 inhibitor.  She is on Zetia 8/18/2023 LDL 71 HDL 68 triglycerides 70.  This to be followed by Dr. Ramos    4. HTN- Well controlled.  Weight down 11 pounds    5.  Pulm embolism.  She is now off of anticoagulation.  This is a 2023    EKG today.    Return 1 year.

## 2024-05-14 NOTE — PROGRESS NOTES
Referred by No ref. provider found    HPI I'm seeing Jesi for a yearly follow up. Wt down 11#.  She has been on Zetia since I had seen her last year.  She was seen by Dr. Rouse because of a small ulcer on the lateral aspect of the left ankle.    Past Medical History:  Problem List Items Addressed This Visit    None       Past Medical History:   Diagnosis Date    Acute recurrent pansinusitis 10/03/2016    Acute recurrent pansinusitis    Aneurysm of the ascending aorta, without rupture (CMS-HCC)     Ascending aortic aneurysm    Asymptomatic varicose veins of bilateral lower extremities     Varicose veins of legs    Essential (primary) hypertension     Essential hypertension, benign    Hyperlipidemia 07/11/2022    Pt declines statins    Pain in leg, unspecified     Leg pain    Personal history of other diseases of the circulatory system     History of carotid artery stenosis    Personal history of other diseases of the musculoskeletal system and connective tissue     History of arthritis    Personal history of other endocrine, nutritional and metabolic disease     History of hyperlipidemia    Personal history of other endocrine, nutritional and metabolic disease     History of obesity    Personal history of other endocrine, nutritional and metabolic disease 03/22/2019    History of hypercholesterolemia    Personal history of other specified conditions 03/22/2019    History of chest pain    Personal history of transient ischemic attack (TIA), and cerebral infarction without residual deficits     History of cerebrovascular accident    Transient cerebral ischemic attack, unspecified     Transient ischemic attack             Past Surgical History:  She has a past surgical history that includes Other surgical history (03/22/2019) and Gallbladder surgery (06/26/2018).      Social History:  She reports that she quit smoking about 16 years ago. Her smoking use included cigarettes. She has never used smokeless tobacco. She  reports that she does not drink alcohol and does not use drugs.    Family History:  No family history on file.     Allergies:  Ibuprofen, Naproxen, Naproxen sodium, Varenicline, Atorvastatin, Bacitracin zinc-polymyxin b, Cinnamon, Iopanoic acid, Iophendylate, Latex, Lisinopril, Losartan, Other, Pitavastatin, Statins-hmg-coa reductase inhibitors, Triclocarban, Umeclidinium-vilanterol, Adhesive, Adhesive tape-silicones, Amoxicillin, Amoxicillin-pot clavulanate, Bacitracin, Benzalkonium chloride, Betamethasone, Ciprofloxacin, Clavulanic acid, Clotrimazole, Clotrimazole-betamethasone, Codeine, Codeine-guaifenesin, Doxycycline, Erythromycin, Guaifenesin, Minocycline, Morphine, Neomycin, Neomycin-bacitracin-polymyxin, Polymyxin b, Prednisone, Terbinafine, Terbinafine hcl, and Tylenol extended release    Outpatient Medications:  Current Outpatient Medications   Medication Instructions    albuterol 2.5 mg /3 mL (0.083 %) nebulizer solution Inhale 3 mL 3 times a day by nebulization route.    albuterol 90 mcg/actuation inhaler INHALE TWO PUFFS BY MOUTH AS INSTRUCTED EVERY 4 HOURS AS NEEDED    aspirin 81 mg    Breztri Aerosphere 160-9-4.8 mcg/actuation HFA aerosol inhaler 2 puffs, inhalation, 2 times daily    calcium phosphate trib/vit D3 (CALCIUM PHOSPHATE-VITAMIN D3 ORAL) oral, Daily RT    cefuroxime (CEFTIN) 500 mg, oral, 2 times daily    clopidogrel (Plavix) 75 mg tablet     doxazosin (Cardura) 2 mg tablet 1 tablet, oral, Nightly    doxazosin (Cardura) 4 mg tablet 1 tablet, oral, Nightly    econazole nitrate 1 % cream APPLY TWICE DAILY TO LEFT FOOT    Eliquis 5 mg, oral, 2 times daily    ezetimibe (ZETIA) 10 mg, oral, Daily    flaxseed oiL 1,000 mg capsule oral    losartan (COZAAR) 25 mg, oral, Daily    metoprolol succinate XL (Toprol-XL) 50 mg 24 hr tablet 1 tablet, oral, Daily    metoprolol tartrate (Lopressor) 25 mg tablet     pantoprazole (ProtoNix) 40 mg EC tablet     pravastatin (Pravachol) 40 mg tablet 1 tablet,  oral, Daily    Prolia 60 mg/mL syringe     simvastatin (Zocor) 20 mg tablet oral    torsemide (Demadex) 20 mg tablet 1 tablet, oral, Daily        Last Recorded Vitals:  There were no vitals filed for this visit.    Physical Exam    Physical  Patient is alert and oriented x3.  HEENT is unremarkable mucous members are moist  Neck no JVP no bruits upstrokes are full no thyromegaly  Lungs are clear bilaterally.  No wheezing crackles or rales  Heart regular rhythm normal S1-S2 there is no S3 no murmurs are heard.  Abdomen is soft vessels are positive nontender nondistended no organomegaly no pulsatile masses  Extremities have trace edema.  Erythema left lateral ankle.  No open wound distal pulses present palpable.  Neuro is grossly nonfocal  Skin has no rashes     Last Labs:  CBC -  Lab Results   Component Value Date    WBC 7.8 08/18/2023    HGB 11.1 (L) 08/18/2023    HCT 35.7 (L) 08/18/2023    MCV 89 08/18/2023     08/18/2023       CMP -  Lab Results   Component Value Date    CALCIUM 8.0 (L) 08/18/2023    PHOS 2.1 (L) 03/15/2023    PROT 6.1 (L) 08/18/2023    ALBUMIN 3.8 08/18/2023    AST 26 08/18/2023    ALT 16 08/18/2023    ALKPHOS 56 08/18/2023    BILITOT 0.3 08/18/2023       LIPID PANEL -   Lab Results   Component Value Date    CHOL 153 08/18/2023    HDL 67.8 08/18/2023    CHHDL 2.3 08/18/2023    VLDL 14 08/18/2023    TRIG 70 08/18/2023       RENAL FUNCTION PANEL -   Lab Results   Component Value Date    K 4.1 08/18/2023    PHOS 2.1 (L) 03/15/2023       Lab Results   Component Value Date    BNP 87 08/16/2023     Procedure  PHARM NST [04/13/2023]: Normal â€“ 68%     ECHO [03/15/2023]: EF 60-65%. SD = impaired relaxation pattern.      CAROTID [03/15/2023]: Less than 50% stenosis proximal YUNIOR / LICA          Assessment/Plan   1.  NSTEMI.  This is April 2023 associate with a pulmonary embolism.  No chest pain.  Continue antiplatelet therapy and cholesterol reduction regadenoson nuclear stress test April 2023 no  evidence of ischemia ejection fraction 68%.  I believe this is demand ischemia.  Continue with beta-blockade     2. Shortness of breath. Being evaluated by pulmonary.  She is on pulmonary rehab     3. Hyperlipidemia. She declines statins. We did discuss Nexletol. She is not able to afford it. She is not able to afford a PCSK9 inhibitor.  She is on Zetia 8/18/2023 LDL 71 HDL 68 triglycerides 70.  This to be followed by Dr. Ramos    4. HTN- Well controlled.  Weight down 11 pounds    5.  Pulm embolism.  She is now off of anticoagulation.  This is a 2023    EKG today.    Return 1 year.       Parker Sahni MD     Instructions and follow up

## 2024-09-05 ENCOUNTER — APPOINTMENT (OUTPATIENT)
Dept: VASCULAR SURGERY | Facility: CLINIC | Age: 79
End: 2024-09-05
Payer: MEDICARE

## 2024-09-05 VITALS
BODY MASS INDEX: 33.49 KG/M2 | HEART RATE: 59 BPM | OXYGEN SATURATION: 92 % | HEIGHT: 62 IN | WEIGHT: 182 LBS | SYSTOLIC BLOOD PRESSURE: 130 MMHG | DIASTOLIC BLOOD PRESSURE: 70 MMHG

## 2024-09-05 DIAGNOSIS — R22.42 LOCALIZED SWELLING OF LEFT LOWER LEG: ICD-10-CM

## 2024-09-05 DIAGNOSIS — I80.01: ICD-10-CM

## 2024-09-05 DIAGNOSIS — I83.893 VARICOSE VEINS OF BILATERAL LOWER EXTREMITIES WITH OTHER COMPLICATIONS: Primary | ICD-10-CM

## 2024-09-05 PROCEDURE — 99213 OFFICE O/P EST LOW 20 MIN: CPT | Performed by: SURGERY

## 2024-09-05 PROCEDURE — 3075F SYST BP GE 130 - 139MM HG: CPT | Performed by: SURGERY

## 2024-09-05 PROCEDURE — 3078F DIAST BP <80 MM HG: CPT | Performed by: SURGERY

## 2024-09-05 PROCEDURE — 1160F RVW MEDS BY RX/DR IN RCRD: CPT | Performed by: SURGERY

## 2024-09-05 PROCEDURE — 1159F MED LIST DOCD IN RCRD: CPT | Performed by: SURGERY

## 2024-09-07 PROBLEM — R22.42 LOCALIZED SWELLING OF LEFT LOWER LEG: Status: ACTIVE | Noted: 2024-09-07

## 2024-09-07 NOTE — PROGRESS NOTES
"Jesi Ramos is a 78 y.o. female     Subjective   This patient presents today for follow-up of her venous issues of her lower extremities.  She is currently 78 years old.  She is 5 foot 2 and does weigh 182 pounds.  She quit smoking in 2008.  She is currently not a diabetic.  She denies any fever chills nausea vomiting or headache.  She does have a history of developing a DVT of her right leg about 2 years ago.  She states that her swelling is somewhat controlled.         Objective     Vitals:    09/05/24 1100   BP: 130/70   Pulse: 59   SpO2: 92%      Physical Exam  This patient is alert and oriented x 3.  She is in no acute distress.  Head is normocephalic.  Pupils are equal and reactive to light and accommodation.  Neck is soft and supple without palpable lymph nodes.  Heart is regular rate.  Lungs are relatively clear.  Abdomen is obese with positive bowel sounds there is no pain on palpation.  Upper extremities seem to have adequate range of motion.  Her lower extremities have slight decreased range of motion.  She does have palpable brachial radial femoral popliteal and pedal pulses.  Her left lower leg volume is actually slightly greater than her right.  Skin turgor is slightly decreased in her left lower extremity.  Psychologically she is acting appropriately  Blood pressure 130/70, pulse 59, height 1.568 m (5' 1.75\"), weight 82.6 kg (182 lb), SpO2 92%.            Patient Active Problem List    Diagnosis Date Noted    Asteatosis cutis 04/11/2024    Vascular insufficiency 04/11/2024    Urticaria 04/11/2024    Transient ischemic attack 04/11/2024    Onychomycosis 04/11/2024    Tailor's bunion of both feet 04/11/2024    Pulmonary embolism (Multi) 04/11/2024    Pneumonia 04/11/2024    Phlebitis of leg, superficial, right 04/11/2024    PAD (peripheral artery disease) (CMS-[facility]) 04/11/2024    Low back pain 04/11/2024    Wound of lower extremity 04/11/2024    Hx pulmonary embolism 04/11/2024    History of " cerebrovascular accident 04/11/2024    History of obesity 04/11/2024    Elevated troponin level 04/11/2024    Heloma molle 04/11/2024    Gastroesophageal reflux disease 04/11/2024    Antalgic gait 04/11/2024    Foot pain, bilateral 04/11/2024    CAD (coronary artery disease) 04/11/2024    Chronic lung disease 04/11/2024    Contact dermatitis 04/11/2024    Chronic cholecystitis with calculus 04/11/2024    Acute non Q wave myocardial infarction (Multi) 04/11/2024    Impingement of left shoulder 03/28/2024    Trochanteric bursitis of left hip 11/13/2023    Arthritis of right shoulder region 11/13/2023    Primary osteoarthritis of both knees 08/31/2023    Abdominal pain 07/27/2023    PMB (postmenopausal bleeding) 06/30/2023    Primary osteoarthritis of left knee 06/12/2023    Lumbar spine strain, initial encounter 06/12/2023    Ascending aortic aneurysm (CMS-HCC) 07/11/2022    Hyperlipidemia 07/11/2022    Essential hypertension 07/11/2022    Abnormal ECG 07/11/2022    Chest pain, midsternal 04/28/2022    Shortness of breath 07/21/2021    Pulmonary nodules 07/21/2021    Otalgia 05/20/2014    Dizziness 05/20/2014    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    Varicose veins of both lower extremities 05/04/2005          Current Outpatient Medications:     albuterol 90 mcg/actuation inhaler, INHALE TWO PUFFS BY MOUTH AS INSTRUCTED EVERY 4 HOURS AS NEEDED, Disp: , Rfl:     aspirin 81 mg EC tablet, 1 tablet (81 mg)., Disp: , Rfl:     Breztri Aerosphere 160-9-4.8 mcg/actuation HFA aerosol inhaler, Inhale 2 puffs twice a day., Disp: , Rfl:     calcium phosphate trib/vit D3 (CALCIUM PHOSPHATE-VITAMIN D3 ORAL), Take by mouth once daily., Disp: , Rfl:     doxazosin (Cardura) 2 mg tablet, Take 1 tablet (2 mg) by mouth once daily at bedtime., Disp: , Rfl:     doxazosin (Cardura) 4 mg tablet, Take 1 tablet (4 mg) by mouth once daily at bedtime., Disp: , Rfl:     econazole nitrate 1 % cream, APPLY TWICE DAILY TO LEFT  FOOT, Disp: , Rfl:     ezetimibe (Zetia) 10 mg tablet, Take 1 tablet (10 mg) by mouth once daily., Disp: 90 tablet, Rfl: 3    flaxseed oiL 1,000 mg capsule, Take by mouth., Disp: , Rfl:     metoprolol succinate XL (Toprol-XL) 50 mg 24 hr tablet, Take 1 tablet (50 mg) by mouth once daily., Disp: , Rfl:     pantoprazole (ProtoNix) 40 mg EC tablet, , Disp: , Rfl:     Prolia 60 mg/mL syringe, , Disp: , Rfl:     torsemide (Demadex) 20 mg tablet, Take 1 tablet (20 mg) by mouth once daily., Disp: , Rfl:      Lab Results   Component Value Date    WBC 7.8 08/18/2023    HGB 11.1 (L) 08/18/2023    HCT 35.7 (L) 08/18/2023     08/18/2023    CHOL 153 08/18/2023    TRIG 70 08/18/2023    HDL 67.8 08/18/2023    ALT 16 08/18/2023    AST 26 08/18/2023     08/18/2023    K 4.1 08/18/2023     08/18/2023    CREATININE 1.04 08/18/2023    BUN 15 08/18/2023    CO2 28 08/18/2023    TSH 1.77 08/18/2023    INR CANCELED 03/14/2023    INR 1.3 (H) 03/14/2023       Vascular US lower extremity venous insufficiency bilateral    Result Date: 3/3/2024           [place] [address] Tel [phone] and Fax [phone]  Vascular Lab Report VASC US LOWER EXTREMITY VENOUS INSUFFICIENCY BILATERAL  Patient Name:      KAYLEE VELASQUEZ        Reading Physician:  [id number] Brianna Garcia MD Study Date:        3/1/2024            Ordering Physician: [id number] JERRELL CHEN MRN/PID:           [id number]            Technologist:       Justyna Dietz RVSHIMON Accession#:        [id number]        Technologist 2: Date of Birth/Age: 1945 / 78     Encounter#:         [id number]                    years Gender:            F Admission Status:  Outpatient          Location Performed: [facility]  Diagnosis/ICD:    Venous insufficiency (chronic) (peripheral)-I87.2 Indication:       Varicose veins ulcer CPT Codes:        11496 Venous reflux study VV VI  complete Patient Position: Study performed in a reverse Trendelenburg position.  CONCLUSIONS: Right Lower Venous Insufficiency: Right leg demonstrates no evidence of deep vein thrombosis or deep or superficial venous insufficiency. The small saphenous vein was not visualized. Left Lower Venous Insufficiency: Left leg demonstrates no evidence of deep vein thrombosis or deep or superficial venous insufficiency. The small saphenous vein was not visualized.  Comparison: Compared with study from 3/15/2023, resolution of prior right leg deep vein thrombosis.  Imaging & Doppler Findings:  Right          Compress Thrombus SFJ              Yes      None Prox Thigh GSV   Yes      None Mid Thigh GSV    Yes      None Knee GSV         Yes      None Prox Calf GSV    Yes      None Mid Calf GSV     Yes      None Dist Calf GSV    Yes      None  Left           Compress Thrombus SFJ              Yes      None Prox Thigh GSV   Yes      None Mid Thigh GSV    Yes      None Knee GSV         Yes      None Prox Calf GSV    Yes      None Mid Calf GSV     Yes      None Dist Calf GSV    Yes      None  Right                 Compressible Thrombus        Flow Distal External Iliac     Yes        None       Pulsatile CFV                       Yes        None       Pulsatile PFV                       Yes        None FV Proximal               Yes        None       Pulsatile FV Mid                    Yes        None       Pulsatile FV Distal                 Yes        None Popliteal                 Yes        None   Spontaneous/Phasic Peroneal                  Yes        None PTV                       Yes        None  Left                  Compress Thrombus        Flow Distal External Iliac   Yes      None       Pulsatile CFV                     Yes      None       Pulsatile PFV                     Yes      None FV Proximal             Yes      None       Pulsatile FV Mid                  Yes      None       Pulsatile FV Distal               Yes       None   Spontaneous/Phasic Popliteal               Yes      None   Spontaneous/Phasic Peroneal                Yes      None PTV                     Yes      None  [id number] Brianna Garcia MD Electronically signed by [id number] Brianna Garcia MD on 3/3/2024 at 1:33:36 PM  ** Final **                 Assessment/Plan   Active Problems:  There are no active Hospital Problems.      This patient presents today for follow-up of her venous issues of her lower extremities along with swelling.  She quit smoking in 2008.  She is 5 foot 2 and weighs 182 pounds.  She does have a history of DVT in her right leg that was diagnosed in March of last year.  She is currently not on any anticoagulation but she is taking aspirin as directed.  I am encouraging her to wear her compression stockings.  She also needs to periodically elevate her legs above her heart during the day and to exercise her legs at least 5 days a week.  I would like her to follow-up with me in 6 months      Ovi Rouse, DO

## 2024-10-29 ENCOUNTER — APPOINTMENT (OUTPATIENT)
Dept: CARDIOLOGY | Facility: HOSPITAL | Age: 79
End: 2024-10-29
Payer: MEDICARE

## 2024-10-29 ENCOUNTER — APPOINTMENT (OUTPATIENT)
Dept: RADIOLOGY | Facility: HOSPITAL | Age: 79
End: 2024-10-29
Payer: MEDICARE

## 2024-10-29 ENCOUNTER — HOSPITAL ENCOUNTER (OUTPATIENT)
Facility: HOSPITAL | Age: 79
Setting detail: OBSERVATION
Discharge: HOME | End: 2024-10-30
Attending: EMERGENCY MEDICINE | Admitting: INTERNAL MEDICINE
Payer: MEDICARE

## 2024-10-29 DIAGNOSIS — I21.4 ACUTE NON Q WAVE MYOCARDIAL INFARCTION (MULTI): ICD-10-CM

## 2024-10-29 DIAGNOSIS — I25.119 CORONARY ARTERY DISEASE INVOLVING NATIVE CORONARY ARTERY OF NATIVE HEART WITH ANGINA PECTORIS: ICD-10-CM

## 2024-10-29 DIAGNOSIS — I25.110 CORONARY ARTERY DISEASE INVOLVING NATIVE CORONARY ARTERY OF NATIVE HEART WITH UNSTABLE ANGINA PECTORIS: ICD-10-CM

## 2024-10-29 DIAGNOSIS — R07.89 CHEST PAIN, MIDSTERNAL: ICD-10-CM

## 2024-10-29 DIAGNOSIS — Z01.89 ENCOUNTER FOR OTHER SPECIFIED SPECIAL EXAMINATIONS: ICD-10-CM

## 2024-10-29 DIAGNOSIS — R07.9 ACUTE CHEST PAIN: Primary | ICD-10-CM

## 2024-10-29 LAB
ALBUMIN SERPL BCP-MCNC: 3.6 G/DL (ref 3.4–5)
ALP SERPL-CCNC: 51 U/L (ref 33–136)
ALT SERPL W P-5'-P-CCNC: 10 U/L (ref 7–45)
ANION GAP SERPL CALC-SCNC: 9 MMOL/L (ref 10–20)
AORTIC VALVE MEAN GRADIENT: 5 MMHG
AORTIC VALVE PEAK VELOCITY: 1.52 M/S
APTT PPP: 26 SECONDS (ref 27–38)
APTT PPP: 33 SECONDS (ref 27–38)
AST SERPL W P-5'-P-CCNC: 15 U/L (ref 9–39)
AV PEAK GRADIENT: 9.2 MMHG
AVA (PEAK VEL): 2.38 CM2
AVA (VTI): 2.51 CM2
BASOPHILS # BLD AUTO: 0.03 X10*3/UL (ref 0–0.1)
BASOPHILS NFR BLD AUTO: 0.6 %
BILIRUB SERPL-MCNC: 0.4 MG/DL (ref 0–1.2)
BNP SERPL-MCNC: 108 PG/ML (ref 0–99)
BUN SERPL-MCNC: 19 MG/DL (ref 6–23)
CALCIUM SERPL-MCNC: 9 MG/DL (ref 8.6–10.3)
CARDIAC TROPONIN I PNL SERPL HS: 13 NG/L (ref 0–13)
CARDIAC TROPONIN I PNL SERPL HS: 460 NG/L (ref 0–13)
CARDIAC TROPONIN I PNL SERPL HS: 56 NG/L (ref 0–13)
CARDIAC TROPONIN I PNL SERPL HS: 863 NG/L (ref 0–13)
CHLORIDE SERPL-SCNC: 103 MMOL/L (ref 98–107)
CHOLEST SERPL-MCNC: 171 MG/DL (ref 0–199)
CHOLESTEROL/HDL RATIO: 2.6
CO2 SERPL-SCNC: 31 MMOL/L (ref 21–32)
CREAT SERPL-MCNC: 0.98 MG/DL (ref 0.5–1.05)
EGFRCR SERPLBLD CKD-EPI 2021: 58 ML/MIN/1.73M*2
EJECTION FRACTION APICAL 4 CHAMBER: 63.4
EJECTION FRACTION: 63 %
EOSINOPHIL # BLD AUTO: 0.07 X10*3/UL (ref 0–0.4)
EOSINOPHIL NFR BLD AUTO: 1.3 %
ERYTHROCYTE [DISTWIDTH] IN BLOOD BY AUTOMATED COUNT: 14.7 % (ref 11.5–14.5)
GLUCOSE SERPL-MCNC: 94 MG/DL (ref 74–99)
HCT VFR BLD AUTO: 37.2 % (ref 36–46)
HDLC SERPL-MCNC: 64.9 MG/DL
HGB BLD-MCNC: 11.7 G/DL (ref 12–16)
IMM GRANULOCYTES # BLD AUTO: 0.02 X10*3/UL (ref 0–0.5)
IMM GRANULOCYTES NFR BLD AUTO: 0.4 % (ref 0–0.9)
INR PPP: 1 (ref 0.9–1.1)
LDLC SERPL CALC-MCNC: 95 MG/DL
LEFT ATRIUM VOLUME AREA LENGTH INDEX BSA: 47.8 ML/M2
LEFT VENTRICLE INTERNAL DIMENSION DIASTOLE: 4.5 CM (ref 3.5–6)
LEFT VENTRICULAR OUTFLOW TRACT DIAMETER: 2.3 CM
LYMPHOCYTES # BLD AUTO: 1.84 X10*3/UL (ref 0.8–3)
LYMPHOCYTES NFR BLD AUTO: 33.9 %
MAGNESIUM SERPL-MCNC: 2.03 MG/DL (ref 1.6–2.4)
MCH RBC QN AUTO: 28.5 PG (ref 26–34)
MCHC RBC AUTO-ENTMCNC: 31.5 G/DL (ref 32–36)
MCV RBC AUTO: 91 FL (ref 80–100)
MITRAL VALVE E/A RATIO: 0.62
MONOCYTES # BLD AUTO: 0.59 X10*3/UL (ref 0.05–0.8)
MONOCYTES NFR BLD AUTO: 10.9 %
NEUTROPHILS # BLD AUTO: 2.87 X10*3/UL (ref 1.6–5.5)
NEUTROPHILS NFR BLD AUTO: 52.9 %
NON HDL CHOLESTEROL: 106 MG/DL (ref 0–149)
NRBC BLD-RTO: 0 /100 WBCS (ref 0–0)
PLATELET # BLD AUTO: 193 X10*3/UL (ref 150–450)
POTASSIUM SERPL-SCNC: 4.2 MMOL/L (ref 3.5–5.3)
PROT SERPL-MCNC: 6.2 G/DL (ref 6.4–8.2)
PROTHROMBIN TIME: 10.7 SECONDS (ref 9.8–12.8)
RBC # BLD AUTO: 4.11 X10*6/UL (ref 4–5.2)
RIGHT VENTRICLE FREE WALL PEAK S': 17.1 CM/S
SODIUM SERPL-SCNC: 139 MMOL/L (ref 136–145)
TRICUSPID ANNULAR PLANE SYSTOLIC EXCURSION: 2.5 CM
TRIGL SERPL-MCNC: 55 MG/DL (ref 0–149)
UFH PPP CHRO-ACNC: 1.1 IU/ML
VLDL: 11 MG/DL (ref 0–40)
WBC # BLD AUTO: 5.4 X10*3/UL (ref 4.4–11.3)

## 2024-10-29 PROCEDURE — 36415 COLL VENOUS BLD VENIPUNCTURE: CPT | Performed by: EMERGENCY MEDICINE

## 2024-10-29 PROCEDURE — 93005 ELECTROCARDIOGRAM TRACING: CPT

## 2024-10-29 PROCEDURE — 85730 THROMBOPLASTIN TIME PARTIAL: CPT | Performed by: EMERGENCY MEDICINE

## 2024-10-29 PROCEDURE — G0378 HOSPITAL OBSERVATION PER HR: HCPCS

## 2024-10-29 PROCEDURE — 85025 COMPLETE CBC W/AUTO DIFF WBC: CPT | Performed by: EMERGENCY MEDICINE

## 2024-10-29 PROCEDURE — 96365 THER/PROPH/DIAG IV INF INIT: CPT | Mod: 59

## 2024-10-29 PROCEDURE — 85610 PROTHROMBIN TIME: CPT | Performed by: EMERGENCY MEDICINE

## 2024-10-29 PROCEDURE — 2500000001 HC RX 250 WO HCPCS SELF ADMINISTERED DRUGS (ALT 637 FOR MEDICARE OP): Performed by: EMERGENCY MEDICINE

## 2024-10-29 PROCEDURE — 71045 X-RAY EXAM CHEST 1 VIEW: CPT | Performed by: RADIOLOGY

## 2024-10-29 PROCEDURE — 84484 ASSAY OF TROPONIN QUANT: CPT | Performed by: EMERGENCY MEDICINE

## 2024-10-29 PROCEDURE — 93306 TTE W/DOPPLER COMPLETE: CPT | Performed by: INTERNAL MEDICINE

## 2024-10-29 PROCEDURE — 96372 THER/PROPH/DIAG INJ SC/IM: CPT

## 2024-10-29 PROCEDURE — 80061 LIPID PANEL: CPT

## 2024-10-29 PROCEDURE — 2500000001 HC RX 250 WO HCPCS SELF ADMINISTERED DRUGS (ALT 637 FOR MEDICARE OP)

## 2024-10-29 PROCEDURE — 96366 THER/PROPH/DIAG IV INF ADDON: CPT

## 2024-10-29 PROCEDURE — 93306 TTE W/DOPPLER COMPLETE: CPT

## 2024-10-29 PROCEDURE — 2500000004 HC RX 250 GENERAL PHARMACY W/ HCPCS (ALT 636 FOR OP/ED)

## 2024-10-29 PROCEDURE — 83880 ASSAY OF NATRIURETIC PEPTIDE: CPT | Performed by: EMERGENCY MEDICINE

## 2024-10-29 PROCEDURE — 85730 THROMBOPLASTIN TIME PARTIAL: CPT

## 2024-10-29 PROCEDURE — 84484 ASSAY OF TROPONIN QUANT: CPT

## 2024-10-29 PROCEDURE — 71045 X-RAY EXAM CHEST 1 VIEW: CPT

## 2024-10-29 PROCEDURE — 85520 HEPARIN ASSAY: CPT | Performed by: EMERGENCY MEDICINE

## 2024-10-29 PROCEDURE — 99285 EMERGENCY DEPT VISIT HI MDM: CPT | Mod: 25

## 2024-10-29 PROCEDURE — 80053 COMPREHEN METABOLIC PANEL: CPT | Performed by: EMERGENCY MEDICINE

## 2024-10-29 PROCEDURE — 83735 ASSAY OF MAGNESIUM: CPT | Performed by: EMERGENCY MEDICINE

## 2024-10-29 RX ORDER — DOXAZOSIN 2 MG/1
4 TABLET ORAL NIGHTLY
Status: DISCONTINUED | OUTPATIENT
Start: 2024-10-29 | End: 2024-10-30

## 2024-10-29 RX ORDER — ALBUTEROL SULFATE 90 UG/1
2 INHALANT RESPIRATORY (INHALATION) EVERY 2 HOUR PRN
Status: DISCONTINUED | OUTPATIENT
Start: 2024-10-29 | End: 2024-10-30 | Stop reason: HOSPADM

## 2024-10-29 RX ORDER — HEPARIN SODIUM 5000 [USP'U]/ML
2000-4000 INJECTION, SOLUTION INTRAVENOUS; SUBCUTANEOUS EVERY 4 HOURS PRN
Status: DISCONTINUED | OUTPATIENT
Start: 2024-10-29 | End: 2024-10-30

## 2024-10-29 RX ORDER — NITROGLYCERIN 0.4 MG/1
0.4 TABLET SUBLINGUAL ONCE
Status: COMPLETED | OUTPATIENT
Start: 2024-10-29 | End: 2024-10-29

## 2024-10-29 RX ORDER — CALCIUM CARBONATE/VITAMIN D3 600MG-5MCG
1 TABLET ORAL DAILY
Status: DISCONTINUED | OUTPATIENT
Start: 2024-10-29 | End: 2024-10-30 | Stop reason: HOSPADM

## 2024-10-29 RX ORDER — CALCIUM CARBONATE/VITAMIN D3 600MG-5MCG
1 TABLET ORAL DAILY
COMMUNITY

## 2024-10-29 RX ORDER — HEPARIN SODIUM 10000 [USP'U]/100ML
0-4000 INJECTION, SOLUTION INTRAVENOUS CONTINUOUS
Status: DISCONTINUED | OUTPATIENT
Start: 2024-10-29 | End: 2024-10-30

## 2024-10-29 RX ORDER — NAPROXEN SODIUM 220 MG/1
325 TABLET, FILM COATED ORAL ONCE
Status: DISCONTINUED | OUTPATIENT
Start: 2024-10-29 | End: 2024-10-29

## 2024-10-29 RX ORDER — NAPROXEN SODIUM 220 MG/1
325 TABLET, FILM COATED ORAL DAILY
Status: DISCONTINUED | OUTPATIENT
Start: 2024-10-29 | End: 2024-10-30

## 2024-10-29 RX ORDER — ASPIRIN 81 MG/1
81 TABLET ORAL DAILY
Status: DISCONTINUED | OUTPATIENT
Start: 2024-10-29 | End: 2024-10-29

## 2024-10-29 RX ORDER — ALBUTEROL SULFATE 90 UG/1
2 INHALANT RESPIRATORY (INHALATION) EVERY 4 HOURS PRN
Status: DISCONTINUED | OUTPATIENT
Start: 2024-10-29 | End: 2024-10-29

## 2024-10-29 RX ORDER — METOPROLOL TARTRATE 25 MG/1
25 TABLET, FILM COATED ORAL 2 TIMES DAILY
Status: DISCONTINUED | OUTPATIENT
Start: 2024-10-29 | End: 2024-10-30

## 2024-10-29 RX ORDER — HEPARIN SODIUM 5000 [USP'U]/ML
4000 INJECTION, SOLUTION INTRAVENOUS; SUBCUTANEOUS ONCE
Status: COMPLETED | OUTPATIENT
Start: 2024-10-29 | End: 2024-10-29

## 2024-10-29 RX ORDER — TORSEMIDE 20 MG/1
20 TABLET ORAL DAILY PRN
Status: DISCONTINUED | OUTPATIENT
Start: 2024-10-29 | End: 2024-10-30 | Stop reason: HOSPADM

## 2024-10-29 RX ORDER — ACETAMINOPHEN 325 MG/1
975 TABLET ORAL ONCE
Status: COMPLETED | OUTPATIENT
Start: 2024-10-29 | End: 2024-10-29

## 2024-10-29 RX ORDER — FLUTICASONE FUROATE AND VILANTEROL 100; 25 UG/1; UG/1
1 POWDER RESPIRATORY (INHALATION)
Status: DISCONTINUED | OUTPATIENT
Start: 2024-10-29 | End: 2024-10-29

## 2024-10-29 RX ORDER — EZETIMIBE 10 MG/1
10 TABLET ORAL DAILY
Status: DISCONTINUED | OUTPATIENT
Start: 2024-10-29 | End: 2024-10-30

## 2024-10-29 RX ORDER — DOXAZOSIN 2 MG/1
2 TABLET ORAL NIGHTLY
Status: DISCONTINUED | OUTPATIENT
Start: 2024-10-29 | End: 2024-10-30

## 2024-10-29 RX ORDER — PANTOPRAZOLE SODIUM 40 MG/1
40 TABLET, DELAYED RELEASE ORAL DAILY
Status: DISCONTINUED | OUTPATIENT
Start: 2024-10-29 | End: 2024-10-30 | Stop reason: HOSPADM

## 2024-10-29 RX ORDER — ENOXAPARIN SODIUM 100 MG/ML
40 INJECTION SUBCUTANEOUS EVERY 24 HOURS
Status: DISCONTINUED | OUTPATIENT
Start: 2024-10-29 | End: 2024-10-29

## 2024-10-29 RX ORDER — METOPROLOL TARTRATE 25 MG/1
25 TABLET, FILM COATED ORAL 2 TIMES DAILY
COMMUNITY
End: 2024-10-30 | Stop reason: HOSPADM

## 2024-10-29 SDOH — SOCIAL STABILITY: SOCIAL INSECURITY: DOES ANYONE TRY TO KEEP YOU FROM HAVING/CONTACTING OTHER FRIENDS OR DOING THINGS OUTSIDE YOUR HOME?: NO

## 2024-10-29 SDOH — HEALTH STABILITY: PHYSICAL HEALTH: ON AVERAGE, HOW MANY MINUTES DO YOU ENGAGE IN EXERCISE AT THIS LEVEL?: 30 MIN

## 2024-10-29 SDOH — ECONOMIC STABILITY: TRANSPORTATION INSECURITY: IN THE PAST 12 MONTHS, HAS LACK OF TRANSPORTATION KEPT YOU FROM MEDICAL APPOINTMENTS OR FROM GETTING MEDICATIONS?: NO

## 2024-10-29 SDOH — SOCIAL STABILITY: SOCIAL INSECURITY: ABUSE: ADULT

## 2024-10-29 SDOH — SOCIAL STABILITY: SOCIAL INSECURITY: WITHIN THE LAST YEAR, HAVE YOU BEEN HUMILIATED OR EMOTIONALLY ABUSED IN OTHER WAYS BY YOUR PARTNER OR EX-PARTNER?: NO

## 2024-10-29 SDOH — ECONOMIC STABILITY: FOOD INSECURITY: HOW HARD IS IT FOR YOU TO PAY FOR THE VERY BASICS LIKE FOOD, HOUSING, MEDICAL CARE, AND HEATING?: NOT HARD AT ALL

## 2024-10-29 SDOH — ECONOMIC STABILITY: HOUSING INSECURITY: AT ANY TIME IN THE PAST 12 MONTHS, WERE YOU HOMELESS OR LIVING IN A SHELTER (INCLUDING NOW)?: NO

## 2024-10-29 SDOH — SOCIAL STABILITY: SOCIAL INSECURITY: WITHIN THE LAST YEAR, HAVE YOU BEEN AFRAID OF YOUR PARTNER OR EX-PARTNER?: NO

## 2024-10-29 SDOH — ECONOMIC STABILITY: INCOME INSECURITY: IN THE PAST 12 MONTHS HAS THE ELECTRIC, GAS, OIL, OR WATER COMPANY THREATENED TO SHUT OFF SERVICES IN YOUR HOME?: NO

## 2024-10-29 SDOH — SOCIAL STABILITY: SOCIAL INSECURITY: HAVE YOU HAD THOUGHTS OF HARMING ANYONE ELSE?: NO

## 2024-10-29 SDOH — SOCIAL STABILITY: SOCIAL INSECURITY: ARE THERE ANY APPARENT SIGNS OF INJURIES/BEHAVIORS THAT COULD BE RELATED TO ABUSE/NEGLECT?: NO

## 2024-10-29 SDOH — SOCIAL STABILITY: SOCIAL INSECURITY
WITHIN THE LAST YEAR, HAVE YOU BEEN KICKED, HIT, SLAPPED, OR OTHERWISE PHYSICALLY HURT BY YOUR PARTNER OR EX-PARTNER?: NO

## 2024-10-29 SDOH — SOCIAL STABILITY: SOCIAL INSECURITY
WITHIN THE LAST YEAR, HAVE YOU BEEN RAPED OR FORCED TO HAVE ANY KIND OF SEXUAL ACTIVITY BY YOUR PARTNER OR EX-PARTNER?: NO

## 2024-10-29 SDOH — ECONOMIC STABILITY: FOOD INSECURITY: WITHIN THE PAST 12 MONTHS, THE FOOD YOU BOUGHT JUST DIDN'T LAST AND YOU DIDN'T HAVE MONEY TO GET MORE.: NEVER TRUE

## 2024-10-29 SDOH — ECONOMIC STABILITY: FOOD INSECURITY: WITHIN THE PAST 12 MONTHS, YOU WORRIED THAT YOUR FOOD WOULD RUN OUT BEFORE YOU GOT THE MONEY TO BUY MORE.: NEVER TRUE

## 2024-10-29 SDOH — ECONOMIC STABILITY: HOUSING INSECURITY: IN THE LAST 12 MONTHS, WAS THERE A TIME WHEN YOU WERE NOT ABLE TO PAY THE MORTGAGE OR RENT ON TIME?: NO

## 2024-10-29 SDOH — SOCIAL STABILITY: SOCIAL INSECURITY: WERE YOU ABLE TO COMPLETE ALL THE BEHAVIORAL HEALTH SCREENINGS?: YES

## 2024-10-29 SDOH — SOCIAL STABILITY: SOCIAL INSECURITY: DO YOU FEEL UNSAFE GOING BACK TO THE PLACE WHERE YOU ARE LIVING?: NO

## 2024-10-29 SDOH — HEALTH STABILITY: PHYSICAL HEALTH: ON AVERAGE, HOW MANY DAYS PER WEEK DO YOU ENGAGE IN MODERATE TO STRENUOUS EXERCISE (LIKE A BRISK WALK)?: 3 DAYS

## 2024-10-29 SDOH — SOCIAL STABILITY: SOCIAL INSECURITY: ARE YOU OR HAVE YOU BEEN THREATENED OR ABUSED PHYSICALLY, EMOTIONALLY, OR SEXUALLY BY ANYONE?: NO

## 2024-10-29 SDOH — SOCIAL STABILITY: SOCIAL INSECURITY: HAS ANYONE EVER THREATENED TO HURT YOUR FAMILY OR YOUR PETS?: NO

## 2024-10-29 SDOH — SOCIAL STABILITY: SOCIAL INSECURITY: DO YOU FEEL ANYONE HAS EXPLOITED OR TAKEN ADVANTAGE OF YOU FINANCIALLY OR OF YOUR PERSONAL PROPERTY?: NO

## 2024-10-29 SDOH — SOCIAL STABILITY: SOCIAL INSECURITY: HAVE YOU HAD ANY THOUGHTS OF HARMING ANYONE ELSE?: NO

## 2024-10-29 SDOH — ECONOMIC STABILITY: HOUSING INSECURITY: IN THE PAST 12 MONTHS, HOW MANY TIMES HAVE YOU MOVED WHERE YOU WERE LIVING?: 0

## 2024-10-29 ASSESSMENT — ACTIVITIES OF DAILY LIVING (ADL)
LACK_OF_TRANSPORTATION: NO
HEARING - LEFT EAR: FUNCTIONAL
FEEDING YOURSELF: INDEPENDENT
TOILETING: INDEPENDENT
ADEQUATE_TO_COMPLETE_ADL: YES
LACK_OF_TRANSPORTATION: NO
HEARING - RIGHT EAR: FUNCTIONAL
GROOMING: INDEPENDENT
JUDGMENT_ADEQUATE_SAFELY_COMPLETE_DAILY_ACTIVITIES: YES
WALKS IN HOME: INDEPENDENT
DRESSING YOURSELF: INDEPENDENT
PATIENT'S MEMORY ADEQUATE TO SAFELY COMPLETE DAILY ACTIVITIES?: YES
BATHING: INDEPENDENT

## 2024-10-29 ASSESSMENT — PATIENT HEALTH QUESTIONNAIRE - PHQ9
1. LITTLE INTEREST OR PLEASURE IN DOING THINGS: NOT AT ALL
SUM OF ALL RESPONSES TO PHQ9 QUESTIONS 1 & 2: 0
2. FEELING DOWN, DEPRESSED OR HOPELESS: NOT AT ALL

## 2024-10-29 ASSESSMENT — LIFESTYLE VARIABLES
HOW OFTEN DO YOU HAVE A DRINK CONTAINING ALCOHOL: NEVER
SKIP TO QUESTIONS 9-10: 1
HOW OFTEN DO YOU HAVE 6 OR MORE DRINKS ON ONE OCCASION: NEVER
EVER HAD A DRINK FIRST THING IN THE MORNING TO STEADY YOUR NERVES TO GET RID OF A HANGOVER: NO
HOW MANY STANDARD DRINKS CONTAINING ALCOHOL DO YOU HAVE ON A TYPICAL DAY: PATIENT DOES NOT DRINK
AUDIT-C TOTAL SCORE: 0
HAVE YOU EVER FELT YOU SHOULD CUT DOWN ON YOUR DRINKING: NO
EVER FELT BAD OR GUILTY ABOUT YOUR DRINKING: NO
AUDIT-C TOTAL SCORE: 0
SUBSTANCE_ABUSE_PAST_12_MONTHS: NO
PRESCIPTION_ABUSE_PAST_12_MONTHS: NO
HAVE PEOPLE ANNOYED YOU BY CRITICIZING YOUR DRINKING: NO
TOTAL SCORE: 0

## 2024-10-29 ASSESSMENT — COGNITIVE AND FUNCTIONAL STATUS - GENERAL
PATIENT BASELINE BEDBOUND: NO
CLIMB 3 TO 5 STEPS WITH RAILING: A LITTLE
MOBILITY SCORE: 24
DAILY ACTIVITIY SCORE: 24
PATIENT BASELINE BEDBOUND: NO
MOBILITY SCORE: 23
DAILY ACTIVITIY SCORE: 24

## 2024-10-29 ASSESSMENT — PAIN - FUNCTIONAL ASSESSMENT: PAIN_FUNCTIONAL_ASSESSMENT: 0-10

## 2024-10-29 ASSESSMENT — CHA2DS2 SCORE
SEX: FEMALE
AGE IN YEARS: 75+

## 2024-10-29 ASSESSMENT — COLUMBIA-SUICIDE SEVERITY RATING SCALE - C-SSRS

## 2024-10-29 ASSESSMENT — PAIN SCALES - GENERAL
PAINLEVEL_OUTOF10: 0 - NO PAIN
PAINLEVEL_OUTOF10: 7
PAINLEVEL_OUTOF10: 0 - NO PAIN

## 2024-10-29 ASSESSMENT — PAIN DESCRIPTION - LOCATION: LOCATION: HEAD

## 2024-10-30 VITALS
TEMPERATURE: 97.2 F | DIASTOLIC BLOOD PRESSURE: 85 MMHG | HEIGHT: 60 IN | SYSTOLIC BLOOD PRESSURE: 160 MMHG | WEIGHT: 182 LBS | BODY MASS INDEX: 35.73 KG/M2 | RESPIRATION RATE: 18 BRPM | OXYGEN SATURATION: 96 % | HEART RATE: 87 BPM

## 2024-10-30 LAB
ANION GAP SERPL CALC-SCNC: 13 MMOL/L (ref 10–20)
ATRIAL RATE: 82 BPM
BUN SERPL-MCNC: 22 MG/DL (ref 6–23)
CALCIUM SERPL-MCNC: 9.1 MG/DL (ref 8.6–10.3)
CHLORIDE SERPL-SCNC: 107 MMOL/L (ref 98–107)
CO2 SERPL-SCNC: 28 MMOL/L (ref 21–32)
CREAT SERPL-MCNC: 0.98 MG/DL (ref 0.5–1.05)
EGFRCR SERPLBLD CKD-EPI 2021: 58 ML/MIN/1.73M*2
ERYTHROCYTE [DISTWIDTH] IN BLOOD BY AUTOMATED COUNT: 15 % (ref 11.5–14.5)
EST. AVERAGE GLUCOSE BLD GHB EST-MCNC: 123 MG/DL
GLUCOSE SERPL-MCNC: 99 MG/DL (ref 74–99)
HBA1C MFR BLD: 5.9 %
HCT VFR BLD AUTO: 37.2 % (ref 36–46)
HGB BLD-MCNC: 11.6 G/DL (ref 12–16)
MCH RBC QN AUTO: 28.3 PG (ref 26–34)
MCHC RBC AUTO-ENTMCNC: 31.2 G/DL (ref 32–36)
MCV RBC AUTO: 91 FL (ref 80–100)
NRBC BLD-RTO: 0 /100 WBCS (ref 0–0)
P AXIS: 42 DEGREES
P OFFSET: 186 MS
P ONSET: 135 MS
PLATELET # BLD AUTO: 197 X10*3/UL (ref 150–450)
POTASSIUM SERPL-SCNC: 4 MMOL/L (ref 3.5–5.3)
PR INTERVAL: 162 MS
Q ONSET: 216 MS
QRS COUNT: 14 BEATS
QRS DURATION: 86 MS
QT INTERVAL: 372 MS
QTC CALCULATION(BAZETT): 434 MS
QTC FREDERICIA: 412 MS
R AXIS: -26 DEGREES
RBC # BLD AUTO: 4.1 X10*6/UL (ref 4–5.2)
SODIUM SERPL-SCNC: 144 MMOL/L (ref 136–145)
T AXIS: -23 DEGREES
T OFFSET: 402 MS
UFH PPP CHRO-ACNC: 0.9 IU/ML
UFH PPP CHRO-ACNC: 1 IU/ML
VENTRICULAR RATE: 82 BPM
WBC # BLD AUTO: 5.8 X10*3/UL (ref 4.4–11.3)

## 2024-10-30 PROCEDURE — 83036 HEMOGLOBIN GLYCOSYLATED A1C: CPT

## 2024-10-30 PROCEDURE — 2500000001 HC RX 250 WO HCPCS SELF ADMINISTERED DRUGS (ALT 637 FOR MEDICARE OP)

## 2024-10-30 PROCEDURE — 7100000010 HC PHASE TWO TIME - EACH INCREMENTAL 1 MINUTE: Performed by: INTERNAL MEDICINE

## 2024-10-30 PROCEDURE — C1887 CATHETER, GUIDING: HCPCS | Performed by: INTERNAL MEDICINE

## 2024-10-30 PROCEDURE — 7100000009 HC PHASE TWO TIME - INITIAL BASE CHARGE: Performed by: INTERNAL MEDICINE

## 2024-10-30 PROCEDURE — G0378 HOSPITAL OBSERVATION PER HR: HCPCS

## 2024-10-30 PROCEDURE — 2500000004 HC RX 250 GENERAL PHARMACY W/ HCPCS (ALT 636 FOR OP/ED): Performed by: INTERNAL MEDICINE

## 2024-10-30 PROCEDURE — C1760 CLOSURE DEV, VASC: HCPCS | Performed by: INTERNAL MEDICINE

## 2024-10-30 PROCEDURE — 99223 1ST HOSP IP/OBS HIGH 75: CPT

## 2024-10-30 PROCEDURE — 36415 COLL VENOUS BLD VENIPUNCTURE: CPT | Performed by: INTERNAL MEDICINE

## 2024-10-30 PROCEDURE — 85520 HEPARIN ASSAY: CPT | Performed by: INTERNAL MEDICINE

## 2024-10-30 PROCEDURE — 2500000002 HC RX 250 W HCPCS SELF ADMINISTERED DRUGS (ALT 637 FOR MEDICARE OP, ALT 636 FOR OP/ED)

## 2024-10-30 PROCEDURE — 94640 AIRWAY INHALATION TREATMENT: CPT

## 2024-10-30 PROCEDURE — 80048 BASIC METABOLIC PNL TOTAL CA: CPT

## 2024-10-30 PROCEDURE — 2550000001 HC RX 255 CONTRASTS: Performed by: INTERNAL MEDICINE

## 2024-10-30 PROCEDURE — C1894 INTRO/SHEATH, NON-LASER: HCPCS | Performed by: INTERNAL MEDICINE

## 2024-10-30 PROCEDURE — 2720000007 HC OR 272 NO HCPCS: Performed by: INTERNAL MEDICINE

## 2024-10-30 PROCEDURE — 85027 COMPLETE CBC AUTOMATED: CPT

## 2024-10-30 PROCEDURE — 93458 L HRT ARTERY/VENTRICLE ANGIO: CPT | Performed by: INTERNAL MEDICINE

## 2024-10-30 PROCEDURE — 2500000001 HC RX 250 WO HCPCS SELF ADMINISTERED DRUGS (ALT 637 FOR MEDICARE OP): Performed by: INTERNAL MEDICINE

## 2024-10-30 PROCEDURE — 99152 MOD SED SAME PHYS/QHP 5/>YRS: CPT | Performed by: INTERNAL MEDICINE

## 2024-10-30 PROCEDURE — 2780000003 HC OR 278 NO HCPCS: Performed by: INTERNAL MEDICINE

## 2024-10-30 RX ORDER — DOXAZOSIN 2 MG/1
3 TABLET ORAL 2 TIMES DAILY
Status: DISCONTINUED | OUTPATIENT
Start: 2024-10-30 | End: 2024-10-30 | Stop reason: HOSPADM

## 2024-10-30 RX ORDER — LIDOCAINE HYDROCHLORIDE 20 MG/ML
INJECTION, SOLUTION INFILTRATION; PERINEURAL AS NEEDED
Status: DISCONTINUED | OUTPATIENT
Start: 2024-10-30 | End: 2024-10-30 | Stop reason: HOSPADM

## 2024-10-30 RX ORDER — ENOXAPARIN SODIUM 100 MG/ML
40 INJECTION SUBCUTANEOUS EVERY 24 HOURS
Status: DISCONTINUED | OUTPATIENT
Start: 2024-10-31 | End: 2024-10-30 | Stop reason: HOSPADM

## 2024-10-30 RX ORDER — CARVEDILOL 12.5 MG/1
12.5 TABLET ORAL 2 TIMES DAILY
Qty: 60 TABLET | Refills: 0 | Status: SHIPPED | OUTPATIENT
Start: 2024-10-30 | End: 2024-11-29

## 2024-10-30 RX ORDER — VERAPAMIL HYDROCHLORIDE 2.5 MG/ML
INJECTION, SOLUTION INTRAVENOUS AS NEEDED
Status: DISCONTINUED | OUTPATIENT
Start: 2024-10-30 | End: 2024-10-30 | Stop reason: HOSPADM

## 2024-10-30 RX ORDER — ROSUVASTATIN CALCIUM 10 MG/1
20 TABLET, COATED ORAL NIGHTLY
Status: DISCONTINUED | OUTPATIENT
Start: 2024-10-30 | End: 2024-10-30 | Stop reason: HOSPADM

## 2024-10-30 RX ORDER — METOPROLOL SUCCINATE 25 MG/1
25 TABLET, EXTENDED RELEASE ORAL DAILY
Status: DISCONTINUED | OUTPATIENT
Start: 2024-10-30 | End: 2024-10-30 | Stop reason: HOSPADM

## 2024-10-30 RX ORDER — CHLORHEXIDINE GLUCONATE 40 MG/ML
SOLUTION TOPICAL ONCE
Status: CANCELLED | OUTPATIENT
Start: 2024-10-30 | End: 2024-10-30

## 2024-10-30 RX ORDER — CLOPIDOGREL BISULFATE 75 MG/1
75 TABLET ORAL DAILY
Qty: 30 TABLET | Refills: 0 | Status: SHIPPED | OUTPATIENT
Start: 2024-10-31 | End: 2024-11-30

## 2024-10-30 RX ORDER — CARVEDILOL 3.12 MG/1
6.25 TABLET ORAL DAILY
Status: DISCONTINUED | OUTPATIENT
Start: 2024-10-30 | End: 2024-10-30

## 2024-10-30 RX ORDER — CLOPIDOGREL BISULFATE 75 MG/1
300 TABLET ORAL ONCE
Status: COMPLETED | OUTPATIENT
Start: 2024-10-30 | End: 2024-10-30

## 2024-10-30 RX ORDER — MIDAZOLAM HYDROCHLORIDE 1 MG/ML
INJECTION, SOLUTION INTRAMUSCULAR; INTRAVENOUS AS NEEDED
Status: DISCONTINUED | OUTPATIENT
Start: 2024-10-30 | End: 2024-10-30 | Stop reason: HOSPADM

## 2024-10-30 RX ORDER — CARVEDILOL 12.5 MG/1
12.5 TABLET ORAL 2 TIMES DAILY
Status: DISCONTINUED | OUTPATIENT
Start: 2024-10-30 | End: 2024-10-30 | Stop reason: HOSPADM

## 2024-10-30 RX ORDER — ROSUVASTATIN CALCIUM 20 MG/1
20 TABLET, COATED ORAL NIGHTLY
Qty: 30 TABLET | Refills: 0 | Status: SHIPPED | OUTPATIENT
Start: 2024-10-30 | End: 2024-11-29

## 2024-10-30 RX ORDER — CARVEDILOL 12.5 MG/1
12.5 TABLET ORAL 2 TIMES DAILY
Status: DISCONTINUED | OUTPATIENT
Start: 2024-10-30 | End: 2024-10-30

## 2024-10-30 RX ORDER — CLOPIDOGREL BISULFATE 75 MG/1
75 TABLET ORAL DAILY
Status: DISCONTINUED | OUTPATIENT
Start: 2024-10-31 | End: 2024-10-30 | Stop reason: HOSPADM

## 2024-10-30 RX ORDER — FENTANYL CITRATE 50 UG/ML
INJECTION, SOLUTION INTRAMUSCULAR; INTRAVENOUS AS NEEDED
Status: DISCONTINUED | OUTPATIENT
Start: 2024-10-30 | End: 2024-10-30 | Stop reason: HOSPADM

## 2024-10-30 RX ORDER — WATER
100 LIQUID (ML) MISCELLANEOUS
Status: CANCELLED | OUTPATIENT
Start: 2024-10-30

## 2024-10-30 RX ORDER — CARVEDILOL 3.12 MG/1
6.25 TABLET ORAL 2 TIMES DAILY
Status: DISCONTINUED | OUTPATIENT
Start: 2024-10-30 | End: 2024-10-30

## 2024-10-30 RX ORDER — HEPARIN SODIUM 1000 [USP'U]/ML
INJECTION, SOLUTION INTRAVENOUS; SUBCUTANEOUS AS NEEDED
Status: DISCONTINUED | OUTPATIENT
Start: 2024-10-30 | End: 2024-10-30 | Stop reason: HOSPADM

## 2024-10-30 RX ORDER — NAPROXEN SODIUM 220 MG/1
81 TABLET, FILM COATED ORAL DAILY
Status: DISCONTINUED | OUTPATIENT
Start: 2024-10-31 | End: 2024-10-30 | Stop reason: HOSPADM

## 2024-10-30 ASSESSMENT — PAIN SCALES - GENERAL

## 2024-10-30 ASSESSMENT — PAIN - FUNCTIONAL ASSESSMENT
PAIN_FUNCTIONAL_ASSESSMENT: 0-10

## 2024-10-31 ENCOUNTER — TELEPHONE (OUTPATIENT)
Dept: CARDIOLOGY | Facility: CLINIC | Age: 79
End: 2024-10-31
Payer: MEDICARE

## 2024-10-31 NOTE — TELEPHONE ENCOUNTER
Patient recently hospitalized- had cardiac cath with Dr. Jesus on 10/30  CONCLUSIONS:   1. Mild, non obstructive CAD.   2. Left Ventricular end-diastolic pressure = 23.    She is asking for hospital follow up appt with Dr. Sahni in 4 weeks.

## 2024-11-10 LAB
ATRIAL RATE: 82 BPM
P AXIS: 42 DEGREES
P OFFSET: 186 MS
P ONSET: 135 MS
PR INTERVAL: 162 MS
Q ONSET: 216 MS
QRS COUNT: 14 BEATS
QRS DURATION: 86 MS
QT INTERVAL: 372 MS
QTC CALCULATION(BAZETT): 434 MS
QTC FREDERICIA: 412 MS
R AXIS: -26 DEGREES
T AXIS: -23 DEGREES
T OFFSET: 402 MS
VENTRICULAR RATE: 82 BPM

## 2024-11-13 PROBLEM — I99.8 VASCULAR INSUFFICIENCY: Status: RESOLVED | Noted: 2024-04-11 | Resolved: 2024-11-13

## 2024-11-13 PROBLEM — N95.2 VAGINAL ATROPHY: Status: ACTIVE | Noted: 2024-08-01

## 2024-11-13 PROBLEM — M79.606 PAIN OF LOWER EXTREMITY: Status: ACTIVE | Noted: 2024-11-13

## 2024-11-13 PROBLEM — N81.11 MIDLINE CYSTOCELE: Status: ACTIVE | Noted: 2024-05-10

## 2024-11-13 PROBLEM — E66.812 CLASS 2 OBESITY WITH BODY MASS INDEX (BMI) OF 35.0 TO 35.9 IN ADULT: Status: ACTIVE | Noted: 2024-04-11

## 2024-11-13 PROBLEM — J44.9 CHRONIC OBSTRUCTIVE PULMONARY DISEASE (MULTI): Status: ACTIVE | Noted: 2024-04-11

## 2024-11-13 PROBLEM — Z86.711 HX PULMONARY EMBOLISM: Chronic | Status: RESOLVED | Noted: 2024-04-11 | Resolved: 2024-11-13

## 2024-11-13 PROBLEM — I83.90 VARICOSE VEINS OF LOWER EXTREMITY: Status: RESOLVED | Noted: 2024-11-13 | Resolved: 2024-11-13

## 2024-11-13 PROBLEM — N39.46 MIXED INCONTINENCE URGE AND STRESS: Status: ACTIVE | Noted: 2024-05-10

## 2024-11-13 PROBLEM — I83.90 VARICOSE VEINS OF LOWER EXTREMITY: Status: ACTIVE | Noted: 2024-11-13

## 2024-11-13 PROBLEM — M53.3 SACROILIAC JOINT PAIN: Status: ACTIVE | Noted: 2024-05-29

## 2024-11-13 PROBLEM — G45.9 TRANSIENT ISCHEMIC ATTACK: Status: RESOLVED | Noted: 2024-04-11 | Resolved: 2024-11-13

## 2024-11-13 PROBLEM — N81.3 COMPLETE UTEROVAGINAL PROLAPSE: Status: ACTIVE | Noted: 2024-11-13

## 2024-12-10 PROBLEM — K21.9 GASTROESOPHAGEAL REFLUX DISEASE: Status: RESOLVED | Noted: 2024-04-11 | Resolved: 2024-12-10

## 2024-12-10 PROBLEM — K80.10 CHRONIC CHOLECYSTITIS WITH CALCULUS: Status: RESOLVED | Noted: 2024-04-11 | Resolved: 2024-12-10

## 2024-12-10 PROBLEM — M79.672 FOOT PAIN, BILATERAL: Status: RESOLVED | Noted: 2024-04-11 | Resolved: 2024-12-10

## 2024-12-10 PROBLEM — J44.9 CHRONIC OBSTRUCTIVE PULMONARY DISEASE (MULTI): Chronic | Status: ACTIVE | Noted: 2024-04-11

## 2024-12-10 PROBLEM — M70.62 TROCHANTERIC BURSITIS OF LEFT HIP: Status: RESOLVED | Noted: 2023-11-13 | Resolved: 2024-12-10

## 2024-12-10 PROBLEM — M21.621 TAILOR'S BUNION OF BOTH FEET: Status: RESOLVED | Noted: 2024-04-11 | Resolved: 2024-12-10

## 2024-12-10 PROBLEM — R10.9 ABDOMINAL PAIN: Status: RESOLVED | Noted: 2023-07-27 | Resolved: 2024-12-10

## 2024-12-10 PROBLEM — N95.0 PMB (POSTMENOPAUSAL BLEEDING): Status: RESOLVED | Noted: 2023-06-30 | Resolved: 2024-12-10

## 2024-12-10 PROBLEM — M21.622 TAILOR'S BUNION OF BOTH FEET: Status: RESOLVED | Noted: 2024-04-11 | Resolved: 2024-12-10

## 2024-12-10 PROBLEM — J18.9 PNEUMONIA: Status: RESOLVED | Noted: 2024-04-11 | Resolved: 2024-12-10

## 2024-12-10 PROBLEM — N81.11 MIDLINE CYSTOCELE: Status: RESOLVED | Noted: 2024-05-10 | Resolved: 2024-12-10

## 2024-12-10 PROBLEM — Z86.73 HISTORY OF CEREBROVASCULAR ACCIDENT: Chronic | Status: ACTIVE | Noted: 2024-04-11

## 2024-12-10 PROBLEM — M79.671 FOOT PAIN, BILATERAL: Status: RESOLVED | Noted: 2024-04-11 | Resolved: 2024-12-10

## 2024-12-10 PROBLEM — M53.3 SACROILIAC JOINT PAIN: Status: RESOLVED | Noted: 2024-05-29 | Resolved: 2024-12-10

## 2024-12-10 PROBLEM — M17.12 PRIMARY OSTEOARTHRITIS OF LEFT KNEE: Status: RESOLVED | Noted: 2023-06-12 | Resolved: 2024-12-10

## 2024-12-10 PROBLEM — M54.50 LOW BACK PAIN: Status: RESOLVED | Noted: 2024-04-11 | Resolved: 2024-12-10

## 2024-12-10 PROBLEM — S39.012A LUMBAR SPINE STRAIN, INITIAL ENCOUNTER: Status: RESOLVED | Noted: 2023-06-12 | Resolved: 2024-12-10

## 2024-12-10 PROBLEM — M19.011 ARTHRITIS OF RIGHT SHOULDER REGION: Status: RESOLVED | Noted: 2023-11-13 | Resolved: 2024-12-10

## 2024-12-10 PROBLEM — M79.606 PAIN OF LOWER EXTREMITY: Status: RESOLVED | Noted: 2024-11-13 | Resolved: 2024-12-10

## 2024-12-10 PROBLEM — R26.89 ANTALGIC GAIT: Status: RESOLVED | Noted: 2024-04-11 | Resolved: 2024-12-10

## 2024-12-10 PROBLEM — M25.812 IMPINGEMENT OF LEFT SHOULDER: Status: RESOLVED | Noted: 2024-03-28 | Resolved: 2024-12-10

## 2024-12-10 PROBLEM — N39.46 MIXED INCONTINENCE URGE AND STRESS: Status: RESOLVED | Noted: 2024-05-10 | Resolved: 2024-12-10

## 2024-12-10 PROBLEM — L85.3 ASTEATOSIS CUTIS: Status: RESOLVED | Noted: 2024-04-11 | Resolved: 2024-12-10

## 2024-12-10 PROBLEM — L84 HELOMA MOLLE: Status: RESOLVED | Noted: 2024-04-11 | Resolved: 2024-12-10

## 2024-12-10 PROBLEM — S81.809A WOUND OF LOWER EXTREMITY: Status: RESOLVED | Noted: 2024-04-11 | Resolved: 2024-12-10

## 2024-12-10 PROBLEM — I25.10 CAD (CORONARY ARTERY DISEASE): Chronic | Status: ACTIVE | Noted: 2024-04-11

## 2024-12-10 PROBLEM — I80.01: Status: RESOLVED | Noted: 2024-04-11 | Resolved: 2024-12-10

## 2024-12-10 PROBLEM — N81.3 COMPLETE UTEROVAGINAL PROLAPSE: Status: RESOLVED | Noted: 2024-11-13 | Resolved: 2024-12-10

## 2024-12-10 PROBLEM — N95.2 VAGINAL ATROPHY: Status: RESOLVED | Noted: 2024-08-01 | Resolved: 2024-12-10

## 2024-12-10 PROBLEM — L25.9 CONTACT DERMATITIS: Status: RESOLVED | Noted: 2024-04-11 | Resolved: 2024-12-10

## 2024-12-10 PROBLEM — M17.0 PRIMARY OSTEOARTHRITIS OF BOTH KNEES: Status: RESOLVED | Noted: 2023-08-31 | Resolved: 2024-12-10

## 2024-12-10 PROBLEM — L50.9 URTICARIA: Status: RESOLVED | Noted: 2024-04-11 | Resolved: 2024-12-10

## 2024-12-10 PROBLEM — R91.8 PULMONARY NODULES: Status: RESOLVED | Noted: 2021-07-21 | Resolved: 2024-12-10

## 2024-12-10 PROBLEM — B35.1 ONYCHOMYCOSIS: Status: RESOLVED | Noted: 2024-04-11 | Resolved: 2024-12-10

## 2024-12-10 NOTE — PROGRESS NOTES
Referred by No ref. provider found    HPI     I am seeing Jesi for a 6-month follow-up.  She still having shortness of breath.  Being seen by pulmonary medicine.  They do not believe her lungs are the cause.  No chest pain.  She takes her torsemide only occasionally.    Past Medical History:  Problem List Items Addressed This Visit    None     Past Medical History:   Diagnosis Date    Acute recurrent pansinusitis 10/03/2016    Acute recurrent pansinusitis    Aneurysm of the ascending aorta, without rupture (CMS-HCC)     Ascending aortic aneurysm    Asymptomatic varicose veins of bilateral lower extremities     Varicose veins of legs    Essential (primary) hypertension     Essential hypertension, benign    Essential hypertension 07/11/2022    Hx pulmonary embolism 04/11/2024    Hyperlipidemia 07/11/2022    Pt declines statins    Pain in leg, unspecified     Leg pain    Personal history of other diseases of the circulatory system     History of carotid artery stenosis    Personal history of other diseases of the musculoskeletal system and connective tissue     History of arthritis    Personal history of other endocrine, nutritional and metabolic disease     History of hyperlipidemia    Personal history of other endocrine, nutritional and metabolic disease     History of obesity    Personal history of other endocrine, nutritional and metabolic disease 03/22/2019    History of hypercholesterolemia    Personal history of other specified conditions 03/22/2019    History of chest pain    Personal history of transient ischemic attack (TIA), and cerebral infarction without residual deficits     History of cerebrovascular accident    Pulmonary embolism 04/11/2024    Transient cerebral ischemic attack, unspecified     Transient ischemic attack      Past Surgical History:  She has a past surgical history that includes Other surgical history (03/22/2019); Gallbladder surgery (06/26/2018); and Cardiac catheterization (N/A,  10/30/2024).      Social History:  She reports that she quit smoking about 16 years ago. Her smoking use included cigarettes. She has never used smokeless tobacco. She reports that she does not drink alcohol and does not use drugs.    Family History:  No family history on file.     Allergies:  Ibuprofen, Naproxen, Varenicline, Statins-hmg-coa reductase inhibitors, Cinnamon, Iopanoic acid, Iophendylate, Latex, Lisinopril, Losartan, Triclocarban, Umeclidinium-vilanterol, Adhesive, Adhesive tape-silicones, Amoxicillin-pot clavulanate, Benzalkonium chloride, Betamethasone, Ciprofloxacin, Clotrimazole, Codeine, Doxycycline, Erythromycin, Guaifenesin, Minocycline, Morphine, Neomycin, Neomycin-bacitracin-polymyxin, Prednisone, Terbinafine, and Tylenol extended release    Outpatient Medications:  Current Outpatient Medications   Medication Instructions    albuterol 90 mcg/actuation inhaler Inhale 2 puffs every 4 hours if needed.    aspirin 81 mg, oral, Daily    Breztri Aerosphere 160-9-4.8 mcg/actuation HFA aerosol inhaler 2 puffs, inhalation, 2 times daily    calcium carbonate-vitamin D3 (Calcium 600 + D,3,) 600 mg-5 mcg (200 unit) tablet 1 tablet, oral, Daily    carvedilol (COREG) 12.5 mg, oral, 2 times daily    doxazosin (Cardura) 2 mg tablet 1 tablet, oral, Nightly    doxazosin (Cardura) 4 mg tablet 1 tablet, oral, Nightly    flaxseed oiL 1,000 mg, oral, Daily    pantoprazole (ProtoNix) 40 mg EC tablet Take 1 tablet (40 mg) by mouth once daily.    Prolia 60 mg/mL syringe Inject 1 mL (60 mg) under the skin every 6 months.    rosuvastatin (CRESTOR) 20 mg, oral, Nightly    torsemide (Demadex) 20 mg tablet 1 tablet, oral, Daily PRN     Last Recorded Vitals:  There were no vitals filed for this visit.    Physical Exam  Patient is alert and oriented x3.  HEENT is unremarkable mucous members are moist  Neck no JVP no bruits upstrokes are full no thyromegaly  Lungs are clear bilaterally.  No wheezing crackles or rales  Heart  regular rhythm normal S1-S2 there is no S3 no murmurs are heard.  Abdomen is soft bs are positive nontender nondistended no organomegaly no pulsatile masses  Extremities have trace edema.  Erythema left lateral ankle.  No open wound distal pulses present palpable.  Neuro is grossly nonfocal  Skin has no rashes     Last Labs:  CBC -  Lab Results   Component Value Date    WBC 5.8 10/30/2024    HGB 11.6 (L) 10/30/2024    HCT 37.2 10/30/2024    MCV 91 10/30/2024     10/30/2024     CMP -  Lab Results   Component Value Date    CALCIUM 9.1 10/30/2024    PHOS 2.1 (L) 03/15/2023    PROT 6.2 (L) 10/29/2024    ALBUMIN 3.6 10/29/2024    AST 15 10/29/2024    ALT 10 10/29/2024    ALKPHOS 51 10/29/2024    BILITOT 0.4 10/29/2024     LIPID PANEL -   Lab Results   Component Value Date    CHOL 171 10/29/2024    HDL 64.9 10/29/2024    CHHDL 2.6 10/29/2024    VLDL 11 10/29/2024    TRIG 55 10/29/2024    NHDL 106 10/29/2024     RENAL FUNCTION PANEL -   Lab Results   Component Value Date    K 4.0 10/30/2024    PHOS 2.1 (L) 03/15/2023     Lab Results   Component Value Date     (H) 10/29/2024    HGBA1C 5.9 (H) 10/30/2024     Procedure    Cath 10/30/2024 luminal regularities all vessels LVEDP 23 mmHg    TTE 10/29/2024 EF 60%, DD F, LVH    PHARM NST [04/13/2023]: Normal â€“ 68%     ECHO [03/15/2023]: EF 60-65%. SD = impaired relaxation pattern.      CAROTID [03/15/2023]: Less than 50% stenosis proximal YUNIOR / LICA        Assessment/Plan   1.  NSTEMI.  April 2023 pulmonary embolism elevated biomarkers.  Stress test without ischemia.  October 2024 admitted to the hospital chest pain.  Resolved with nitroglycerin in the ambulance.  Biomarkers slightly elevated.  Taken to the Cath Lab.  Mild nonobstructive plaquing.  Left ventricular end-diastolic pressure 23.  Continue antiplatelet therapy, rosuvastatin, beta-blockers and olmesartan.     2. Shortness of breath.  Etiology still remains unclear.  Possibly related to diastolic  dysfunction.  I will support her doxazosin to take 2 mg in the morning 4 mg the evening.  I will have her take her torsemide twice a week consistently.  She will monitor blood pressures at home twice a week write them down and bring them to me     3. Hyperlipidemia. She declines statins. We did discuss Nexletol. She is not able to afford it. She is not able to afford a PCSK9 inhibitor.  She is on Zetia 10/29/2024 LDL 95 HDL 65 triglycerides 55.  LFTs normal    4. HTN-blood pressure yesterday at a pulmonary pulmonologist office was elevated.  Blood pressure today here is okay.  I will have her increase her torsemide to twice a week on a consistent basis to see if this helps with reducing LVEDP and also her blood pressure.  Separate doxazosin 2 mg every morning 4 mg nightly    5.  Pulm embolism.  She is now off of anticoagulation.  This is a 2023    I personally reviewed her catheterization echo images from the hospitalization.  Return 4 months    Parker Sahni MD     Instructions and follow up

## 2024-12-11 ENCOUNTER — APPOINTMENT (OUTPATIENT)
Dept: CARDIOLOGY | Facility: CLINIC | Age: 79
End: 2024-12-11
Payer: MEDICARE

## 2024-12-11 VITALS
OXYGEN SATURATION: 92 % | DIASTOLIC BLOOD PRESSURE: 82 MMHG | SYSTOLIC BLOOD PRESSURE: 128 MMHG | BODY MASS INDEX: 35.15 KG/M2 | WEIGHT: 180 LBS | HEART RATE: 67 BPM

## 2024-12-11 DIAGNOSIS — R07.9 ACUTE CHEST PAIN: Primary | ICD-10-CM

## 2024-12-11 DIAGNOSIS — I25.10 CORONARY ARTERY DISEASE INVOLVING NATIVE CORONARY ARTERY OF NATIVE HEART WITHOUT ANGINA PECTORIS: ICD-10-CM

## 2024-12-11 DIAGNOSIS — I10 ESSENTIAL HYPERTENSION: Chronic | ICD-10-CM

## 2024-12-11 DIAGNOSIS — E78.2 MIXED HYPERLIPIDEMIA: Chronic | ICD-10-CM

## 2024-12-11 DIAGNOSIS — R79.89 ELEVATED TROPONIN LEVEL: ICD-10-CM

## 2024-12-11 DIAGNOSIS — I21.4 ACUTE NON Q WAVE MYOCARDIAL INFARCTION (MULTI): ICD-10-CM

## 2024-12-11 DIAGNOSIS — R07.89 CHEST PAIN, MIDSTERNAL: ICD-10-CM

## 2024-12-11 PROCEDURE — 3079F DIAST BP 80-89 MM HG: CPT | Performed by: INTERNAL MEDICINE

## 2024-12-11 PROCEDURE — 3074F SYST BP LT 130 MM HG: CPT | Performed by: INTERNAL MEDICINE

## 2024-12-11 PROCEDURE — 1160F RVW MEDS BY RX/DR IN RCRD: CPT | Performed by: INTERNAL MEDICINE

## 2024-12-11 PROCEDURE — 1159F MED LIST DOCD IN RCRD: CPT | Performed by: INTERNAL MEDICINE

## 2024-12-11 PROCEDURE — 1036F TOBACCO NON-USER: CPT | Performed by: INTERNAL MEDICINE

## 2024-12-11 PROCEDURE — 99214 OFFICE O/P EST MOD 30 MIN: CPT | Performed by: INTERNAL MEDICINE

## 2024-12-11 RX ORDER — CLOPIDOGREL BISULFATE 75 MG/1
1 TABLET ORAL
COMMUNITY
Start: 2024-11-25

## 2024-12-11 RX ORDER — OLMESARTAN MEDOXOMIL 20 MG/1
1 TABLET ORAL
COMMUNITY
Start: 2024-12-06

## 2024-12-11 RX ORDER — DOXAZOSIN 2 MG/1
2 TABLET ORAL DAILY
Qty: 90 TABLET | Refills: 3 | Status: SHIPPED | OUTPATIENT
Start: 2024-12-11 | End: 2025-12-11

## 2024-12-11 NOTE — PATIENT INSTRUCTIONS
1.  NSTEMI.  April 2023 pulmonary embolism elevated biomarkers.  Stress test without ischemia.  October 2024 admitted to the hospital chest pain.  Resolved with nitroglycerin in the ambulance.  Biomarkers slightly elevated.  Taken to the Cath Lab.  Mild nonobstructive plaquing.  Left ventricular end-diastolic pressure 23.  Continue antiplatelet therapy, rosuvastatin, beta-blockers and olmesartan.     2. Shortness of breath.  Etiology still remains unclear.  Possibly related to diastolic dysfunction.  I will support her doxazosin to take 2 mg in the morning 4 mg the evening.  I will have her take her torsemide twice a week consistently.  She will monitor blood pressures at home twice a week write them down and bring them to me     3. Hyperlipidemia. She declines statins. We did discuss Nexletol. She is not able to afford it. She is not able to afford a PCSK9 inhibitor.  She is on Zetia 10/29/2024 LDL 95 HDL 65 triglycerides 55.  LFTs normal    4. HTN-blood pressure yesterday at a pulmonary pulmonologist office was elevated.  Blood pressure today here is okay.  I will have her increase her torsemide to twice a week on a consistent basis to see if this helps with reducing LVEDP and also her blood pressure.  Separate doxazosin 2 mg every morning 4 mg nightly    5.  Pulm embolism.  She is now off of anticoagulation.  This is a 2023    I personally reviewed her catheterization echo images from the hospitalization.  Return 4 months

## 2025-01-17 ENCOUNTER — APPOINTMENT (OUTPATIENT)
Dept: VASCULAR SURGERY | Facility: CLINIC | Age: 80
End: 2025-01-17
Payer: MEDICARE

## 2025-01-22 ENCOUNTER — APPOINTMENT (OUTPATIENT)
Dept: VASCULAR SURGERY | Facility: CLINIC | Age: 80
End: 2025-01-22
Payer: MEDICARE

## 2025-03-06 ENCOUNTER — APPOINTMENT (OUTPATIENT)
Dept: VASCULAR SURGERY | Facility: CLINIC | Age: 80
End: 2025-03-06
Payer: MEDICARE

## 2025-04-17 ENCOUNTER — APPOINTMENT (OUTPATIENT)
Dept: VASCULAR SURGERY | Facility: CLINIC | Age: 80
End: 2025-04-17
Payer: MEDICARE

## 2025-04-17 VITALS
DIASTOLIC BLOOD PRESSURE: 80 MMHG | SYSTOLIC BLOOD PRESSURE: 148 MMHG | BODY MASS INDEX: 36.12 KG/M2 | HEIGHT: 60 IN | WEIGHT: 184 LBS

## 2025-04-17 DIAGNOSIS — Z86.718 HISTORY OF DEEP VEIN THROMBOSIS (DVT) OF LOWER EXTREMITY: Primary | ICD-10-CM

## 2025-04-17 DIAGNOSIS — Z86.711 HISTORY OF PULMONARY EMBOLISM: ICD-10-CM

## 2025-04-17 DIAGNOSIS — R22.42 LOCALIZED SWELLING OF LEFT LOWER LEG: ICD-10-CM

## 2025-04-17 PROCEDURE — 1160F RVW MEDS BY RX/DR IN RCRD: CPT | Performed by: SURGERY

## 2025-04-17 PROCEDURE — 1159F MED LIST DOCD IN RCRD: CPT | Performed by: SURGERY

## 2025-04-17 PROCEDURE — 3079F DIAST BP 80-89 MM HG: CPT | Performed by: SURGERY

## 2025-04-17 PROCEDURE — 3077F SYST BP >= 140 MM HG: CPT | Performed by: SURGERY

## 2025-04-17 PROCEDURE — 99213 OFFICE O/P EST LOW 20 MIN: CPT | Performed by: SURGERY

## 2025-04-21 PROBLEM — Z86.718 HISTORY OF DEEP VEIN THROMBOSIS (DVT) OF LOWER EXTREMITY: Status: ACTIVE | Noted: 2025-04-21

## 2025-04-22 NOTE — PROGRESS NOTES
Jesi Ramos is a 79 y.o. female     Subjective   This patient presents today for follow-up of her venous issues of her lower extremities.  She quit smoking in 2008.  She is 5 foot tall and weighs 184 pounds.  She denies any significant swelling.  She currently denies any fever chills nausea vomiting or headache.  She denies any achiness or heaviness of her lower extremities.  She does have a history of vein thrombosis involving her right leg back in 2023.  This involved her popliteal vein peroneal vein and posterior tibial vein.  She is currently taking aspirin and Plavix.         Objective     Vitals:    04/17/25 0900   BP: 148/80      Physical Exam  This patient is alert and oriented x 3.  She is in no acute distress.  Head is normocephalic.  Pupils are equal and reactive to light and accommodation.  Neck is soft and supple without palpable lymph nodes.  Heart is regular rate.  Lungs are relatively clear.  Abdomen is obese with positive bowel sounds there is no pain on palpation.  Upper extremities seem to have adequate range of motion.  Her lower extremities have some decreased range of motion.  She does have palpable brachial radial femoral and popliteal pulses.  Skin turgor seems adequate.  Psychologically she seems to be acting appropriately.  Blood pressure 148/80, height 1.524 m (5'), weight 83.5 kg (184 lb).            Patient Active Problem List    Diagnosis Date Noted    Acute chest pain 10/29/2024    Localized swelling of left lower leg 09/07/2024    Pulmonary embolism 04/11/2024    PAD (peripheral artery disease) (CMS-[facility]) 04/11/2024    History of cerebrovascular accident 04/11/2024    Class 2 obesity with body mass index (BMI) of 35.0 to 35.9 in adult 04/11/2024    Elevated troponin level 04/11/2024    CAD (coronary artery disease) 04/11/2024    Chronic obstructive pulmonary disease (Multi) 04/11/2024    Acute non Q wave myocardial infarction (Multi) 04/11/2024    Ascending aortic aneurysm  07/11/2022    Hyperlipidemia 07/11/2022    Essential hypertension 07/11/2022    Abnormal ECG 07/11/2022    Chest pain, midsternal 04/28/2022    Shortness of breath 07/21/2021    Otalgia 05/20/2014    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    Varicose veins of both lower extremities 05/04/2005        Current Medications[1]     Lab Results   Component Value Date    WBC 5.8 10/30/2024    HGB 11.6 (L) 10/30/2024    HCT 37.2 10/30/2024     10/30/2024    CHOL 171 10/29/2024    TRIG 55 10/29/2024    HDL 64.9 10/29/2024    ALT 10 10/29/2024    AST 15 10/29/2024     10/30/2024    K 4.0 10/30/2024     10/30/2024    CREATININE 0.98 10/30/2024    BUN 22 10/30/2024    CO2 28 10/30/2024    TSH 1.77 08/18/2023    INR 1.0 10/29/2024    HGBA1C 5.9 (H) 10/30/2024       ECG 12 lead  Result Date: 11/10/2024  Normal sinus rhythm Inferior infarct , age undetermined Anterior infarct (cited on or before 29-OCT-2024) Abnormal ECG When compared with ECG of 29-OCT-2024 08:34, (unconfirmed) No significant change was found See ED provider note for full interpretation and clinical correlation Confirmed by Tonie Marsh ([id number]) on 11/10/2024 7:53:23 PM    Cardiac Catheterization Procedure  Result Date: 10/30/2024   [facility], Cath Lab, [address] [place] [address] Cardiovascular Catheterization Report Patient Name:      KAYLEE VELASQUEZ        Performing Physician:  Susan Jesus MD Study Date:        10/30/2024            Verifying Physician:   Susan Jesus MD MRN/PID:           [id number]              Cardiologist/Co-Scrub: Accession#:        [id number]          Ordering Provider:     [id number] [facility] Date of Birth/Age: 1945 / 78 years Cardiologist: Gender:            F                     Fellow: Encounter#:        [id number]             Surgeon:  Study: Left Heart Cath  Indications: KAYLEE VELASQUEZ is a 79 year old female who presents with coronary artery disease, hypertension, dyslipidemia and a chest pain assessment of typical angina. NSTE - ACS.  Appropriate Use Criteria: Non ST elevation myocardial infarction with intermediate risk score; AUC score = 8.  Procedure Description: After infiltration with 2% Lidocaine, the right radial artery was cannulated with a modified Seldinger technique. Subsequently a 6 Swedish sheath was placed retrograde in the right radial artery. Selective coronary catheterization was performed using a 5 Fr catheter(s) exchanged over a guide wire to cannulate the coronary arteries. A 5 Fr Tiger catheter was used for left and right coronary artery injections. Multiple injections of contrast were made into the left and right coronary arteries with angiograms recorded in multiple projections. After completion of the procedure, the arterial sheath was pulled and a TR Band Radial Compression Device was utilized to obtain patent hemostasis.  Coronary Angiography: The coronary circulation is right dominant.  Coronary Angiography Comments: Left main coronary artery contains mild luminal regularities. Left anterior descending artery, first diagonal arteries contain mild luminal regularities. Left circumflex artery, first and second obtuse marginal arteries contain mild luminal regularities. The left posterolateral branch contains mild luminal regularities. The right coronary artery contains a 30% stenosis in its midportion. The posterior descending artery and posterolateral branches contain mild luminal regularities.  Left Ventriculography: EDP 23.  Hemo Personnel: +--------------------+---------+ Name                Duty      +--------------------+---------+ Agustin Jesus MD 1 +--------------------+---------+  Hemodynamic Pressures:  +----+---------------+----------+-------------+--------------+-------+---------+  Site   Date Time   Phase NameSystolic mmHgDiastolic mmHgED mmHgMean mmHg +----+---------------+----------+-------------+--------------+-------+---------+   AO     10/30/2024   O2 REST          178            78             119         11:21:50 AM                                                      +----+---------------+----------+-------------+--------------+-------+---------+   LV     10/30/2024   O2 REST          175            16     23                  11:24:14 AM                                                      +----+---------------+----------+-------------+--------------+-------+---------+   LV     10/30/2024   O2 REST          171            17     19                  11:24:20 AM                                                      +----+---------------+----------+-------------+--------------+-------+---------+  LVp     10/30/2024   O2 REST          135            16     18                  11:24:24 AM                                                      +----+---------------+----------+-------------+--------------+-------+---------+  AOp     10/30/2024   O2 REST          175            78             118         11:24:29 AM                                                      +----+---------------+----------+-------------+--------------+-------+---------+  Complications: No in-lab complications observed.  Cardiac Cath Post Procedure Notes: Post Procedure Diagnosis: Mild CAD. Blood Loss:               Estimated blood loss during the procedure was < 10                           mls. Specimens Removed:        Number of specimen(s) removed: none.  Recommendations: Maximize medical therapy. Lipid lowering agent or Statin therapy. Further recommendations per Dr. Sahni. ____________________________________________________________________________________ CONCLUSIONS:  1. Mild, non obstructive CAD.  2. Left Ventricular  end-diastolic pressure = 23. ICD 10 Codes: Non ST elevation (NSTEMI) myocardial infarction-I21.4  CPT Codes: Left Heart Cath (visualization of coronaries) and LV-04450  [id number] Agustin Jesus MD Performing Physician Electronically signed by [id number] Agustin Jesus MD on 10/30/2024 at 2:07:16 PM  ** Final **     Transthoracic Echo (TTE) Complete  Result Date: 10/29/2024   [facility], [address] [place] [address]           Tel [phone] and Fax [phone] TRANSTHORACIC ECHOCARDIOGRAM REPORT  Patient Name:      KAYLEE VELASQUEZ       Reading Physician:    [id number] Pakrer Sahni MD Study Date:        10/29/2024           Ordering Provider:    [id number] LONNIE COVINGTON MRN/PID:           [id number]             Fellow: Accession#:        [id number]         Nurse: Date of Birth/Age: 1945 / 78      Sonographer:          Sue newsome                                      MARCEL Gender:            F                    Additional Staff: Height:            152.00 cm            Admit Date:           10/29/2024 Weight:            82.01 kg             Admission Status:     Inpatient -                                                               Routine BSA / BMI:         1.78 m2 / 35.49      Encounter#:           [id number]                    kg/m2 Blood Pressure:    162/100 mmHg         Department Location:  [facility]                                                               Department Study Type:    TRANSTHORACIC ECHO (TTE) COMPLETE Diagnosis/ICD: Encounter for other specified special examinations-Z01.89 Indication:    Coronary artery disease involving native coronary artery of                native heart with unstable angina pectoris ; CPT Code:      Echo Complete w Full Doppler-[id number] Patient History: Pertinent History: Dyspnea and Chest Pain. PMH aortic aneurysm, hypertension,                    hyperlipidemia,  history of pulmonary embolus, and history of                    cardiovascular disease. Study Detail: The following Echo studies were performed: 2D, M-Mode, Doppler,               color flow, Strain and 3D. Technically challenging study due to               body habitus. Unable to obtain subcostal view.  PHYSICIAN INTERPRETATION: Left Ventricle: Left ventricular ejection fraction is normal, by visual estimate at 60-65%. There are no regional left ventricular wall motion abnormalities. The left ventricular cavity size is normal. There is moderate concentric left ventricular hypertrophy. Spectral Doppler shows an impaired relaxation pattern of left ventricular diastolic filling. Left Atrium: The left atrium is normal in size. Right Ventricle: The right ventricle is normal in size. There is normal right ventricular global systolic function. Right Atrium: The right atrium is normal in size. Aortic Valve: The aortic valve was not well visualized. The aortic valve dimensionless index is 0.60. There is trace to mild aortic valve regurgitation. The peak instantaneous gradient of the aortic valve is 9.2 mmHg. The mean gradient of the aortic valve is 5.0 mmHg. Mitral Valve: The mitral valve is normal in structure. There is trace to mild mitral valve regurgitation. Tricuspid Valve: The tricuspid valve is structurally normal. No evidence of tricuspid regurgitation. Pulmonic Valve: The pulmonic valve is structurally normal. There is no indication of pulmonic valve regurgitation. Pericardium: There is no pericardial effusion noted. Aorta: The aortic root is normal.  CONCLUSIONS:  1. Left ventricular ejection fraction is normal, by visual estimate at 60-65%.  2. Spectral Doppler shows an impaired relaxation pattern of left ventricular diastolic filling.  3. There is moderate concentric left ventricular hypertrophy.  4. There is normal right ventricular global systolic function. QUANTITATIVE DATA SUMMARY:  2D MEASUREMENTS:           Normal Ranges: LAs:             3.90 cm  (2.7-4.0cm) IVSd:            1.70 cm  (0.6-1.1cm) LVPWd:           1.70 cm  (0.6-1.1cm) LVIDd:           4.50 cm  (3.9-5.9cm) LVIDs:           2.90 cm LV Mass Index:   188 g/m2 LVEDV Index:     40 ml/m2 LV % FS          35.6 %  LA VOLUME:                    Normal Ranges: LA Vol A4C:        96.1 ml    (22+/-6mL/m2) LA Vol A2C:        65.3 ml LA Vol BP:         85.4 ml LA Vol Index A4C:  53.8ml/m2 LA Vol Index A2C:  36.6 ml/m2 LA Vol Index BP:   47.8 ml/m2 LA Area A4C:       22.5 cm2 LA Area A2C:       20.0 cm2 LA Major Axis A4C: 4.5 cm LA Major Axis A2C: 5.2 cm LA Volume Index:   47.8 ml/m2  M-MODE MEASUREMENTS:         Normal Ranges: Ao Root:             3.40 cm (2.0-3.7cm) LAs:                 4.30 cm (2.7-4.0cm)  AORTA MEASUREMENTS:         Normal Ranges: Ao Sinus, d:        3.40 cm (2.1-3.5cm) Ao STJ, d:          3.10 cm (1.7-3.4cm) Asc Ao, d:          3.90 cm (2.1-3.4cm)  LV SYSTOLIC FUNCTION BY 2D PLANIMETRY (MOD):                      Normal Ranges: EF-A4C View:    63 % (>=55%) EF-A2C View:    61 % EF-Biplane:     61 % EF-Visual:      63 % LV EF Reported: 63 %  LV DIASTOLIC FUNCTION:             Normal Ranges: MV Peak E:             0.59 m/s    (0.7-1.2 m/s) MV Peak A:             0.95 m/s    (0.42-0.7 m/s) E/A Ratio:             0.62        (1.0-2.2) MV e'                  0.050 m/s   (>8.0) MV lateral e'          0.06 m/s MV medial e'           0.04 m/s MV A Dur:              124.00 msec E/e' Ratio:            11.73       (<8.0) PulmV Sys Ashish:         49.30 cm/s PulmV Mancia Ashish:        31.30 cm/s PulmV S/D Ashish:         1.60 PulmV A Revs Ashish:      29.40 cm/s PulmV A Revs Dur:      90.00 msec  MITRAL VALVE:          Normal Ranges: MV Vmax:      1.15 m/s (<=1.3m/s) MV peak P.3 mmHg (<5mmHg) MV mean P.0 mmHg (<2mmHg) MV DT:        258 msec (150-240msec)  AORTIC VALVE:                     Normal Ranges: AoV Vmax:                1.52 m/s (<=1.7m/s) AoV Peak PG:              9.2 mmHg (<20mmHg) AoV Mean P.0 mmHg (1.7-11.5mmHg) LVOT Max Ashish:            0.87 m/s (<=1.1m/s) AoV VTI:                 24.80 cm (18-25cm) LVOT VTI:                15.00 cm LVOT Diameter:           2.30 cm  (1.8-2.4cm) AoV Area, VTI:           2.51 cm2 (2.5-5.5cm2) AoV Area,Vmax:           2.38 cm2 (2.5-4.5cm2) AoV Dimensionless Index: 0.60  AORTIC INSUFFICIENCY: AI Vmax:       4.22 m/s AI Half-time:  377 msec AI Decel Rate: 328.00 cm/s2  RIGHT VENTRICLE: RV Basal 2.10 cm RV Mid   2.20 cm RV Major 6.9 cm TAPSE:   25.2 mm RV s'    0.17 m/s  PULMONIC VALVE:          Normal Ranges: PV Max Ashish:     0.9 m/s  (0.6-0.9m/s) PV Max PG:      3.2 mmHg  Pulmonary Veins: PulmV A Revs Dur: 90.00 msec PulmV A Revs Ashish: 29.40 cm/s PulmV Mancia Ashish:   31.30 cm/s PulmV S/D Ashish:    1.60 PulmV Sys Ashish:    49.30 cm/s  [id number] Parker Sahni MD Electronically signed on 10/29/2024 at 5:38:00 PM  ** Final **     XR chest 1 view  Result Date: 10/29/2024  Interpreted By:  Kiara Forrester, STUDY: XR CHEST 1 VIEW;  10/29/2024 8:50 am   INDICATION: Signs/Symptoms:sob.   COMPARISON: 2018   ACCESSION NUMBER(S): [id number]   ORDERING CLINICIAN: LONNIE COVINGTON   FINDINGS: CARDIOMEDIASTINAL SILHOUETTE: Cardiomediastinal silhouette is normal in size and configuration.     LUNGS: Lungs are clear.   ABDOMEN: No remarkable upper abdominal findings.     BONES: No acute osseous changes. There is a remote healed fracture of the right 8th rib. There is degenerative change of both glenohumeral joints.       No acute cardiopulmonary process.   MACRO: None   Signed by: Kiara Forrester 10/29/2024 9:01 AM Dictation workstation:   [id number]                Assessment/Plan   Assessment & Plan      This patient presents today for follow-up of her venous issues of her lower extremities.  She denies any significant swelling.  She does have a history of shortness of breath with COPD and history of pulmonary embolism.  Back in 2024,  she did have a venous duplex scan with reflux study that does show pulsatility throughout her right and left lower extremity deep system.  This may be attributed to cardiovascular issues.  She does have a history of acute deep vein thrombosis involving her right lower extremity that was diagnosed in March 2023.  She has no significant swelling.  I am encouraging her to wear her compression stockings on a consistent basis and to periodically elevate her legs above her heart.  I am also encouraging her to exercise her lower extremities at least 5 days a week.  She will follow-up with me in 3 months.  No further testing at this time.      Ovi Rouse, DO          [1]   Current Outpatient Medications:     albuterol 90 mcg/actuation inhaler, Inhale 2 puffs every 4 hours if needed., Disp: , Rfl:     aspirin 81 mg EC tablet, Take 1 tablet (81 mg) by mouth once daily., Disp: , Rfl:     Breztri Aerosphere 160-9-4.8 mcg/actuation HFA aerosol inhaler, Inhale 2 puffs twice a day., Disp: , Rfl:     calcium carbonate-vitamin D3 (Calcium 600 + D,3,) 600 mg-5 mcg (200 unit) tablet, Take 1 tablet by mouth once daily., Disp: , Rfl:     carvedilol (Coreg) 12.5 mg tablet, Take 1 tablet (12.5 mg) by mouth 2 times a day., Disp: 60 tablet, Rfl: 0    clopidogrel (Plavix) 75 mg tablet, Take 1 tablet (75 mg) by mouth early in the morning.., Disp: , Rfl:     doxazosin (Cardura) 2 mg tablet, Take 1 tablet (2 mg) by mouth once daily., Disp: 90 tablet, Rfl: 3    doxazosin (Cardura) 4 mg tablet, Take 1 tablet (4 mg) by mouth once daily at bedtime., Disp: , Rfl:     flaxseed oiL 1,000 mg capsule, Take 1 capsule (1,000 mg) by mouth once daily., Disp: , Rfl:     olmesartan (BENIcar) 20 mg tablet, Take 1 tablet (20 mg) by mouth early in the morning.., Disp: , Rfl:     pantoprazole (ProtoNix) 40 mg EC tablet, Take 1 tablet (40 mg) by mouth once daily., Disp: , Rfl:     Prolia 60 mg/mL syringe, Inject 1 mL (60 mg) under the skin every 6 months.,  Disp: , Rfl:     rosuvastatin (Crestor) 20 mg tablet, Take 1 tablet (20 mg) by mouth once daily at bedtime., Disp: 30 tablet, Rfl: 0    torsemide (Demadex) 20 mg tablet, Take 1 tablet (20 mg) by mouth once daily as needed., Disp: , Rfl:

## 2025-05-13 ENCOUNTER — APPOINTMENT (OUTPATIENT)
Dept: CARDIOLOGY | Facility: CLINIC | Age: 80
End: 2025-05-13
Payer: MEDICARE

## 2025-07-24 ENCOUNTER — APPOINTMENT (OUTPATIENT)
Dept: VASCULAR SURGERY | Facility: CLINIC | Age: 80
End: 2025-07-24
Payer: MEDICARE

## (undated) DEVICE — CATHETER, OPTITORQUE, 5FR, TIG, 1H/100CM

## (undated) DEVICE — KIT, STANDARD, LEFT HEART, WITH MANIFOLD

## (undated) DEVICE — Device

## (undated) DEVICE — GUIDEWIRE, INQWIRE, 3MM J, .035 X 210CM, FIXED

## (undated) DEVICE — TR BAND, RADIAL COMPRESSION, STANDARD, 24CM

## (undated) DEVICE — INTRODUCER, GLIDESHEATH SLENDER A-KIT, 6FR 10CM